# Patient Record
Sex: MALE | Race: WHITE | NOT HISPANIC OR LATINO | Employment: FULL TIME | URBAN - METROPOLITAN AREA
[De-identification: names, ages, dates, MRNs, and addresses within clinical notes are randomized per-mention and may not be internally consistent; named-entity substitution may affect disease eponyms.]

---

## 2017-01-22 DIAGNOSIS — J30.2 SEASONAL ALLERGIC RHINITIS, UNSPECIFIED ALLERGIC RHINITIS TRIGGER: ICD-10-CM

## 2017-01-22 DIAGNOSIS — J01.01 ACUTE RECURRENT MAXILLARY SINUSITIS: Primary | ICD-10-CM

## 2017-01-24 RX ORDER — CETIRIZINE HYDROCHLORIDE 10 MG/1
TABLET ORAL
Qty: 30 TABLET | Refills: 5 | Status: SHIPPED | OUTPATIENT
Start: 2017-01-24 | End: 2017-10-12

## 2017-01-24 NOTE — TELEPHONE ENCOUNTER
Zyrtec 10mg tab      Last Written Prescription Date: 12-  Last Fill Quantity: 30,  # refills: 0   Last Office Visit with G, P or [facility] prescribing provider: 7-

## 2017-04-17 ENCOUNTER — OFFICE VISIT (OUTPATIENT)
Dept: FAMILY MEDICINE | Facility: OTHER | Age: 32
End: 2017-04-17
Attending: NURSE PRACTITIONER
Payer: COMMERCIAL

## 2017-04-17 VITALS
DIASTOLIC BLOOD PRESSURE: 68 MMHG | TEMPERATURE: 98 F | HEART RATE: 64 BPM | WEIGHT: 231 LBS | BODY MASS INDEX: 32.34 KG/M2 | HEIGHT: 71 IN | SYSTOLIC BLOOD PRESSURE: 116 MMHG

## 2017-04-17 DIAGNOSIS — R06.2 WHEEZING: Primary | ICD-10-CM

## 2017-04-17 DIAGNOSIS — F41.9 ANXIETY: ICD-10-CM

## 2017-04-17 DIAGNOSIS — J31.0 CHRONIC RHINITIS: ICD-10-CM

## 2017-04-17 PROCEDURE — 99212 OFFICE O/P EST SF 10 MIN: CPT

## 2017-04-17 PROCEDURE — 99213 OFFICE O/P EST LOW 20 MIN: CPT | Performed by: NURSE PRACTITIONER

## 2017-04-17 RX ORDER — ALBUTEROL SULFATE 90 UG/1
2 AEROSOL, METERED RESPIRATORY (INHALATION) EVERY 4 HOURS PRN
Qty: 1 INHALER | Refills: 5 | Status: SHIPPED | OUTPATIENT
Start: 2017-04-17 | End: 2021-07-01

## 2017-04-17 RX ORDER — NAPROXEN 250 MG/1
TABLET ORAL
COMMUNITY
End: 2023-07-12

## 2017-04-17 RX ORDER — MONTELUKAST SODIUM 10 MG/1
10 TABLET ORAL AT BEDTIME
Qty: 90 TABLET | Refills: 1 | Status: SHIPPED | OUTPATIENT
Start: 2017-04-17 | End: 2017-07-21

## 2017-04-17 RX ORDER — ALPRAZOLAM 0.5 MG
0.25 TABLET ORAL
COMMUNITY
End: 2017-04-17 | Stop reason: ALTCHOICE

## 2017-04-17 RX ORDER — ELECTROLYTES/DEXTROSE
SOLUTION, ORAL ORAL
COMMUNITY

## 2017-04-17 RX ORDER — LORAZEPAM 0.5 MG/1
0.5 TABLET ORAL DAILY PRN
Qty: 20 TABLET | Refills: 0 | Status: SHIPPED | OUTPATIENT
Start: 2017-04-17 | End: 2018-01-04

## 2017-04-17 ASSESSMENT — PAIN SCALES - GENERAL: PAINLEVEL: NO PAIN (0)

## 2017-04-17 NOTE — PROGRESS NOTES
CHIEF COMPLAINT:  Chief Complaint   Patient presents with     URI     Patient has been having issues with sinuses, with sinus pressure, mucous was green, cough started last night.       SUBJECTIVE:   Dorian Cabrera  is here today because of:Sinus pressure and Cough  The patient has had symptoms of cough, earache, sore throat, sneezing, nasal congestion/runny nose, headache, facial pressure, wheezing.     Onset of symptoms was 5 days ago. Course of illness is better today than uesterday.  Patient admits to exposure to illness at home or work/school. Brother with bronchitis.  He does have seasonal allergies.   Patient denies vomiting and diarrhea  Treatment measures tried include zyrtec and flonase.  He has tried advil cold and sinus, Emergen-c and visine allergy relief eye drops which does help.    Patient is not a smoker      Anxiety:  Requesting refill of antianxiety medication.  Last fill was over 1 year ago.  Takes one tablet every couple of weeks.  Has used xanax in the past.        Past Medical History:   Diagnosis Date     Anxiety state, unspecified 08/10/2012     Chronic rhinitis 6/16/2016     Gastroesophageal reflux disease without esophagitis 6/16/2016     Panic disorder without agoraphobia 10/22/2012     Past Surgical History:   Procedure Laterality Date     CIRCUMCISION       HEAD & NECK SURGERY      oral surgery     HERNIA REPAIR, INGUINAL RT/LT      RT      MOUTH SURGERY       Current Outpatient Prescriptions   Medication Sig Dispense Refill     ALPRAZolam (XANAX) 0.5 MG tablet Take 0.25 mg by mouth       Multiple Vitamins-Minerals (MULTIVITAMIN ADULT) TABS        naproxen (NAPROSYN) 250 MG tablet        cetirizine (ZYRTEC) 10 MG tablet TAKE 1 TABLET BY MOUTH EVERY EVENING 30 tablet 5     sertraline (ZOLOFT) 25 MG tablet Take 0.5 tablets (12.5 mg) by mouth daily 30 tablet 1     traMADol (ULTRAM) 50 MG tablet Take 1-2 tablets ( mg) by mouth 2 times daily as needed for moderate pain 20 tablet 0      "fluticasone (FLONASE) 50 MCG/ACT nasal spray Spray 1-2 sprays into both nostrils daily 16 g 5     Omeprazole Magnesium (PRILOSEC OTC PO) Take 1 tablet by mouth daily as needed        Allergies   Allergen Reactions     Cats      Mold      Seasonal Allergies        Family and Social History are reviewed.    REVIEW OF SYSTEMS  Skin: negative  Eyes: itchy watery eyes  Ears/Nose/Throat: as above  Respiratory: Cough- productive mucus  Cardiovascular: negative  Gastrointestinal: negative  Genitourinary: negative  Musculoskeletal: negative  Neurologic: negative  Psychiatric: negative  Hematologic/Lymphatic/Immunologic: allergies  Endocrine: negative    OBJECTIVE:   Vital signs:/68 (BP Location: Left arm, Patient Position: Chair, Cuff Size: Adult Regular)  Pulse 64  Temp 98  F (36.7  C) (Tympanic)  Ht 5' 11\" (1.803 m)  Wt 231 lb (104.8 kg)  BMI 32.22 kg/m2   General: healthy, alert and no distress  Skin is unremarkable.  HEENT: bilateral TM fluid noted, neck without nodes and post nasal drip with cobblestoning noted.  Lungs chest clear to IPPA, no tachypnea, retractions or cyanosis and S1, S2 normal, no murmur, no gallop, rate regular  Rapid Strep Test is not performed  Psych:  Mentation normal, affect bright.     LABS AND IMAGING  Results for orders placed or performed in visit on 01/29/16   CT Sinus w/o Contrast    Narrative    SINUS CT    FINDINGS:  CT scan of the sinuses reveals mild mucosal thickening is  seen in the maxillary sinuses.  The frontal, ethmoid and sphenoid  sinuses are clear.    IMPRESSION:  MILD MUCOSAL THICKENING RIGHT MAXILLARY SINUS.  Exam Date: Feb 01, 2016 07:36:00 AM  Author: KENNEY URSHING  This report is final and signed         ASSESSMENT/PLAN:  1. Wheezing  - albuterol (PROAIR HFA/PROVENTIL HFA/VENTOLIN HFA) 108 (90 BASE) MCG/ACT Inhaler; Inhale 2 puffs into the lungs every 4 hours as needed  Dispense: 1 Inhaler; Refill: 5    2. Chronic rhinitis  Symptomatic  - Continue zyrtec and " flonase  - albuterol (PROAIR HFA/PROVENTIL HFA/VENTOLIN HFA) 108 (90 BASE) MCG/ACT Inhaler; Inhale 2 puffs into the lungs every 4 hours as needed  Dispense: 1 Inhaler; Refill: 5  - montelukast (SINGULAIR) 10 MG tablet; Take 1 tablet (10 mg) by mouth At Bedtime  Dispense: 90 tablet; Refill: 1    3. Anxiety  chronic  - LORazepam (ATIVAN) 0.5 MG tablet; Take 1 tablet (0.5 mg) by mouth daily as needed for anxiety Do not drive after taking this medication  Dispense: 20 tablet; Refill: 0           Increase fluids and rest.   Follow up if symptoms do not improve or worsen    Ana Mckinney,   Certified Adult Nurse Practitioner  [phone]

## 2017-04-17 NOTE — PATIENT INSTRUCTIONS
ASSESSMENT/PLAN:  1. Wheezing  - albuterol (PROAIR HFA/PROVENTIL HFA/VENTOLIN HFA) 108 (90 BASE) MCG/ACT Inhaler; Inhale 2 puffs into the lungs every 4 hours as needed  Dispense: 1 Inhaler; Refill: 5    2. Chronic rhinitis  Symptomatic  - Continue zyrtec and flonase  - albuterol (PROAIR HFA/PROVENTIL HFA/VENTOLIN HFA) 108 (90 BASE) MCG/ACT Inhaler; Inhale 2 puffs into the lungs every 4 hours as needed  Dispense: 1 Inhaler; Refill: 5  - montelukast (SINGULAIR) 10 MG tablet; Take 1 tablet (10 mg) by mouth At Bedtime  Dispense: 90 tablet; Refill: 1    3. Anxiety  chronic  - LORazepam (ATIVAN) 0.5 MG tablet; Take 1 tablet (0.5 mg) by mouth daily as needed for anxiety Do not drive after taking this medication  Dispense: 20 tablet; Refill: 0           Increase fluids and rest.   Follow up if symptoms do not improve or worsen    Ana Mckinney,   Certified Adult Nurse Practitioner  [phone]

## 2017-04-17 NOTE — MR AVS SNAPSHOT
After Visit Summary   4/17/2017    Dorian Cabrera    MRN: [id number]           Patient Information     Date Of Birth          1985        Visit Information        Provider Department      4/17/2017 1:15 PM Ana Mckinney NP [facility]        Today's Diagnoses     Wheezing    -  1    Chronic rhinitis        Anxiety          Care Instructions        ASSESSMENT/PLAN:  1. Wheezing  - albuterol (PROAIR HFA/PROVENTIL HFA/VENTOLIN HFA) 108 (90 BASE) MCG/ACT Inhaler; Inhale 2 puffs into the lungs every 4 hours as needed  Dispense: 1 Inhaler; Refill: 5    2. Chronic rhinitis  Symptomatic  - Continue zyrtec and flonase  - albuterol (PROAIR HFA/PROVENTIL HFA/VENTOLIN HFA) 108 (90 BASE) MCG/ACT Inhaler; Inhale 2 puffs into the lungs every 4 hours as needed  Dispense: 1 Inhaler; Refill: 5  - montelukast (SINGULAIR) 10 MG tablet; Take 1 tablet (10 mg) by mouth At Bedtime  Dispense: 90 tablet; Refill: 1    3. Anxiety  chronic  - LORazepam (ATIVAN) 0.5 MG tablet; Take 1 tablet (0.5 mg) by mouth daily as needed for anxiety Do not drive after taking this medication  Dispense: 20 tablet; Refill: 0           Increase fluids and rest.   Follow up if symptoms do not improve or worsen    Ana Mckinney,   Certified Adult Nurse Practitioner  [phone]        Follow-ups after your visit        Who to contact     If you have questions or need follow up information about today's clinic visit or your schedule please contact [facility] directly at [phone].  Normal or non-critical lab and imaging results will be communicated to you by MyChart, letter or phone within 4 business days after the clinic has received the results. If you do not hear from us within 7 days, please contact the clinic through MyChart or phone. If you have a critical or abnormal lab result, we will notify you by phone as soon as possible.  Submit refill requests through [employer] or call your pharmacy  "and they will forward the refill request to us. Please allow 3 business days for your refill to be completed.          Additional Information About Your Visit        [employer] Information     [employer] lets you send messages to your doctor, view your test results, renew your prescriptions, schedule appointments and more. To sign up, go to [email or web address] . Click on \"Log in\" on the left side of the screen, which will take you to the Welcome page. Then click on \"Sign up Now\" on the right side of the page.     You will be asked to enter the access code listed below, as well as some personal information. Please follow the directions to create your username and password.     Your access code is: [email or web address]  Expires: 2017  1:34 PM     Your access code will  in 90 days. If you need help or a new code, please call your [place] clinic or [phone].        Care EveryWhere ID     This is your Care EveryWhere ID. This could be used by other organizations to access your [place] medical records  [id number]        Your Vitals Were     Pulse Temperature Height BMI (Body Mass Index)          64 98  F (36.7  C) (Tympanic) 5' 11\" (1.803 m) 32.22 kg/m2         Blood Pressure from Last 3 Encounters:   17 116/68   16 132/66   16 118/64    Weight from Last 3 Encounters:   17 231 lb (104.8 kg)   16 210 lb (95.3 kg)   16 214 lb (97.1 kg)              Today, you had the following     No orders found for display         Today's Medication Changes          These changes are accurate as of: 17  1:34 PM.  If you have any questions, ask your nurse or doctor.               Start taking these medicines.        Dose/Directions    albuterol 108 (90 BASE) MCG/ACT Inhaler   Commonly known as:  PROAIR HFA/PROVENTIL HFA/VENTOLIN HFA   Used for:  Wheezing, Chronic rhinitis   Started by:  Ana Mckinney NP        Dose:  2 puff   Inhale 2 puffs into the lungs every 4 hours as needed "   Quantity:  1 Inhaler   Refills:  5       LORazepam 0.5 MG tablet   Commonly known as:  ATIVAN   Used for:  Anxiety   Started by:  Ana Mckinney NP        Dose:  0.5 mg   Take 1 tablet (0.5 mg) by mouth daily as needed for anxiety Do not drive after taking this medication   Quantity:  20 tablet   Refills:  0       montelukast 10 MG tablet   Commonly known as:  SINGULAIR   Used for:  Chronic rhinitis   Started by:  Ana Mckinney NP        Dose:  10 mg   Take 1 tablet (10 mg) by mouth At Bedtime   Quantity:  90 tablet   Refills:  1         Stop taking these medicines if you haven't already. Please contact your care team if you have questions.     ALPRAZolam 0.5 MG tablet   Commonly known as:  XANAX   Stopped by:  Ana Mckinney NP                Where to get your medicines      These medications were sent to [facility] [address] [place] [address] YOMI THOMPSON AT [facility] [address] [place] [address]     Phone:  [phone]     albuterol 108 (90 BASE) MCG/ACT Inhaler    montelukast 10 MG tablet         Some of these will need a paper prescription and others can be bought over the counter.  Ask your nurse if you have questions.     Bring a paper prescription for each of these medications     LORazepam 0.5 MG tablet                Primary Care Provider Office Phone # Fax #    Ana Mckinney [phone]       [facility] [address] [place]  [facility] [address]        Thank you!     Thank you for choosing [facility]  for your care. Our goal is always to provide you with excellent care. Hearing back from our patients is one way we can continue to improve our services. Please take a few minutes to complete the written survey that you may receive in the mail after your visit with us. Thank you!             Your Updated Medication List - Protect others around you: Learn how to safely use, store  and throw away your medicines at www.disposemymeds.org.          This list is accurate as of: 4/17/17  1:34 PM.  Always use your most recent med list.                   Brand Name Dispense Instructions for use    albuterol 108 (90 BASE) MCG/ACT Inhaler    PROAIR HFA/PROVENTIL HFA/VENTOLIN HFA    1 Inhaler    Inhale 2 puffs into the lungs every 4 hours as needed       cetirizine 10 MG tablet    zyrTEC    30 tablet    TAKE 1 TABLET BY MOUTH EVERY EVENING       fluticasone 50 MCG/ACT spray    FLONASE    16 g    Spray 1-2 sprays into both nostrils daily       LORazepam 0.5 MG tablet    ATIVAN    20 tablet    Take 1 tablet (0.5 mg) by mouth daily as needed for anxiety Do not drive after taking this medication       montelukast 10 MG tablet    SINGULAIR    90 tablet    Take 1 tablet (10 mg) by mouth At Bedtime       MULTIVITAMIN ADULT Tabs          naproxen 250 MG tablet    NAPROSYN         PRILOSEC OTC PO      Take 1 tablet by mouth daily as needed       sertraline 25 MG tablet    ZOLOFT    30 tablet    Take 0.5 tablets (12.5 mg) by mouth daily       traMADol 50 MG tablet    ULTRAM    20 tablet    Take 1-2 tablets ( mg) by mouth 2 times daily as needed for moderate pain

## 2017-04-17 NOTE — NURSING NOTE
"Chief Complaint   Patient presents with     URI     Patient has been having issues with sinuses, with sinus pressure, mucous was green, cough started last night.       Initial /68 (BP Location: Left arm, Patient Position: Chair, Cuff Size: Adult Regular)  Pulse 64  Temp 98  F (36.7  C) (Tympanic)  Ht 5' 11\" (1.803 m)  Wt 231 lb (104.8 kg)  BMI 32.22 kg/m2 Estimated body mass index is 32.22 kg/(m^2) as calculated from the following:    Height as of this encounter: 5' 11\" (1.803 m).    Weight as of this encounter: 231 lb (104.8 kg).  Medication Reconciliation: complete   Tobias Gutierrez LPN      "

## 2017-07-21 ENCOUNTER — OFFICE VISIT (OUTPATIENT)
Dept: FAMILY MEDICINE | Facility: OTHER | Age: 32
End: 2017-07-21
Attending: NURSE PRACTITIONER
Payer: COMMERCIAL

## 2017-07-21 VITALS
WEIGHT: 218 LBS | RESPIRATION RATE: 18 BRPM | DIASTOLIC BLOOD PRESSURE: 76 MMHG | BODY MASS INDEX: 30.52 KG/M2 | HEIGHT: 71 IN | HEART RATE: 84 BPM | TEMPERATURE: 97.6 F | OXYGEN SATURATION: 98 % | SYSTOLIC BLOOD PRESSURE: 122 MMHG

## 2017-07-21 DIAGNOSIS — K08.89 PAIN, DENTAL: Primary | ICD-10-CM

## 2017-07-21 PROCEDURE — 99213 OFFICE O/P EST LOW 20 MIN: CPT | Performed by: NURSE PRACTITIONER

## 2017-07-21 PROCEDURE — 99212 OFFICE O/P EST SF 10 MIN: CPT

## 2017-07-21 RX ORDER — IBUPROFEN 800 MG/1
800 TABLET, FILM COATED ORAL EVERY 8 HOURS PRN
Qty: 90 TABLET | Refills: 1 | Status: SHIPPED | OUTPATIENT
Start: 2017-07-21 | End: 2018-01-04

## 2017-07-21 ASSESSMENT — ANXIETY QUESTIONNAIRES
7. FEELING AFRAID AS IF SOMETHING AWFUL MIGHT HAPPEN: NOT AT ALL
3. WORRYING TOO MUCH ABOUT DIFFERENT THINGS: SEVERAL DAYS
5. BEING SO RESTLESS THAT IT IS HARD TO SIT STILL: SEVERAL DAYS
6. BECOMING EASILY ANNOYED OR IRRITABLE: SEVERAL DAYS
2. NOT BEING ABLE TO STOP OR CONTROL WORRYING: NOT AT ALL
1. FEELING NERVOUS, ANXIOUS, OR ON EDGE: SEVERAL DAYS
IF YOU CHECKED OFF ANY PROBLEMS ON THIS QUESTIONNAIRE, HOW DIFFICULT HAVE THESE PROBLEMS MADE IT FOR YOU TO DO YOUR WORK, TAKE CARE OF THINGS AT HOME, OR GET ALONG WITH OTHER PEOPLE: NOT DIFFICULT AT ALL
GAD7 TOTAL SCORE: 5

## 2017-07-21 ASSESSMENT — PAIN SCALES - GENERAL: PAINLEVEL: MODERATE PAIN (4)

## 2017-07-21 ASSESSMENT — PATIENT HEALTH QUESTIONNAIRE - PHQ9: 5. POOR APPETITE OR OVEREATING: SEVERAL DAYS

## 2017-07-21 NOTE — PATIENT INSTRUCTIONS
ASSESSMENT/PLAN:       1. Pain, dental  Start amoxicillin as prescribed by your dentist  Schedule follow-up for extraction   - ibuprofen (ADVIL/MOTRIN) 800 MG tablet; Take 1 tablet (800 mg) by mouth every 8 hours as needed for moderate pain  Dispense: 90 tablet; Refill: 1    FUTURE APPOINTMENTS:       - Follow-up visit as needed    Ana Mckinney, NP  [facility]

## 2017-07-21 NOTE — PROGRESS NOTES
SUBJECTIVE:                                                    Dorian Cabrera is a 31 year old male who presents to clinic today for the following health issues:  Chief Complaint   Patient presents with     Dental Pain     needs wisdom teeth pulled out onset 3 weeks.  has to call back on august 1st to dentist          Concern - dental pain  Onset: 3 weeks    Description:   Alexis tooth that has to be removed.  He has been referred to oral surgery and is waiting for an appt.  He saw his dentist yesterday and was prescribed amoxicillin of which he has not started.  Wanted to check here before starting.     Intensity: 3/10 - constant nagging pain    Progression of Symptoms:  worsening    Accompanying Signs & Symptoms:  No swelling    Previous history of similar problem:   Yes, following with dentist    Precipitating factors:   Worsened by: night    Alleviating factors:  Improved by:     Therapies Tried and outcome: ibuprofen    Denies fever, changes in appetite or ability to eat.       Problem list and histories reviewed & adjusted, as indicated.  Additional history: as documented  Patient Active Problem List   Diagnosis     Anxiety state     Panic disorder without agoraphobia     ACP (advance care planning)     Gastroesophageal reflux disease without esophagitis     Chronic rhinitis     Past Surgical History:   Procedure Laterality Date     CIRCUMCISION       HEAD & NECK SURGERY      oral surgery     HERNIA REPAIR, INGUINAL RT/LT      RT      MOUTH SURGERY         Social History   Substance Use Topics     Smoking status: Former Smoker     Packs/day: 0.50     Years: 5.00     Types: Cigarettes     Quit date: 12/1/2014     Smokeless tobacco: Never Used      Comment: quit dec 1 2014     Alcohol use Yes      Comment: 2 beers, rarely      Family History   Problem Relation Age of Onset     Hypertension Mother      Hypertension Father      Asthma Brother      CANCER Maternal Grandmother      pancreatic     HEART DISEASE  "Maternal Grandfather      CEREBROVASCULAR DISEASE Maternal Grandfather      Thyroid Disease Other      DIABETES No family hx of          Current Outpatient Prescriptions   Medication Sig Dispense Refill     Multiple Vitamins-Minerals (MULTIVITAMIN ADULT) TABS        naproxen (NAPROSYN) 250 MG tablet        albuterol (PROAIR HFA/PROVENTIL HFA/VENTOLIN HFA) 108 (90 BASE) MCG/ACT Inhaler Inhale 2 puffs into the lungs every 4 hours as needed 1 Inhaler 5     LORazepam (ATIVAN) 0.5 MG tablet Take 1 tablet (0.5 mg) by mouth daily as needed for anxiety Do not drive after taking this medication 20 tablet 0     cetirizine (ZYRTEC) 10 MG tablet TAKE 1 TABLET BY MOUTH EVERY EVENING 30 tablet 5     fluticasone (FLONASE) 50 MCG/ACT nasal spray Spray 1-2 sprays into both nostrils daily 16 g 5     Omeprazole Magnesium (PRILOSEC OTC PO) Take 1 tablet by mouth daily as needed       Allergies   Allergen Reactions     Cats      Mold      Seasonal Allergies      Recent Labs   Lab Test  10/12/15   1504  02/05/15   1412   TSH  2.92  3.15      BP Readings from Last 3 Encounters:   07/21/17 122/76   04/17/17 116/68   07/18/16 132/66    Wt Readings from Last 3 Encounters:   07/21/17 218 lb (98.9 kg)   04/17/17 231 lb (104.8 kg)   07/18/16 210 lb (95.3 kg)                          Reviewed and updated as needed this visit by clinical staffTobacco  Allergies  Meds  Problems       Reviewed and updated as needed this visit by Provider         ROS:  Constitutional, HEENT, cardiovascular, pulmonary, gi and gu systems are negative, except as otherwise noted.      OBJECTIVE:   /76 (BP Location: Left arm, Patient Position: Chair, Cuff Size: Adult Large)  Pulse 84  Temp 97.6  F (36.4  C) (Tympanic)  Resp 18  Ht 5' 11\" (1.803 m)  Wt 218 lb (98.9 kg)  SpO2 98%  BMI 30.4 kg/m2  Body mass index is 30.4 kg/(m^2).  GENERAL: healthy, alert and no distress  MS: no gross musculoskeletal defects noted, no edema  PSYCH: mentation appears normal, " affect normal/bright    ASSESSMENT/PLAN:       1. Pain, dental  Start amoxicillin as prescribed by your dentist  Schedule follow-up for extraction   - ibuprofen (ADVIL/MOTRIN) 800 MG tablet; Take 1 tablet (800 mg) by mouth every 8 hours as needed for moderate pain  Dispense: 90 tablet; Refill: 1    FUTURE APPOINTMENTS:       - Follow-up visit as needed    Ana Mckinney, NP  [facility]

## 2017-07-21 NOTE — MR AVS SNAPSHOT
"              After Visit Summary   7/21/2017    Dorina Cabrera    MRN: [id number]           Patient Information     Date Of Birth          1985        Visit Information        Provider Department      7/21/2017 2:30 PM Ana Mckinney NP [facility]        Today's Diagnoses     Pain, dental    -  1      Care Instructions      ASSESSMENT/PLAN:       1. Pain, dental  Start amoxicillin as prescribed by your dentist  Schedule follow-up for extraction   - ibuprofen (ADVIL/MOTRIN) 800 MG tablet; Take 1 tablet (800 mg) by mouth every 8 hours as needed for moderate pain  Dispense: 90 tablet; Refill: 1    FUTURE APPOINTMENTS:       - Follow-up visit as needed    Ana Mckinney NP  [facility]            Follow-ups after your visit        Who to contact     If you have questions or need follow up information about today's clinic visit or your schedule please contact [facility] directly at [phone].  Normal or non-critical lab and imaging results will be communicated to you by Shopflickhart, letter or phone within 4 business days after the clinic has received the results. If you do not hear from us within 7 days, please contact the clinic through Shopflickhart or phone. If you have a critical or abnormal lab result, we will notify you by phone as soon as possible.  Submit refill requests through [employer] or call your pharmacy and they will forward the refill request to us. Please allow 3 business days for your refill to be completed.          Additional Information About Your Visit        Shopflickhart Information     [employer] lets you send messages to your doctor, view your test results, renew your prescriptions, schedule appointments and more. To sign up, go to [email or web address] . Click on \"Log in\" on the left side of the screen, which will take you to the Welcome page. Then click on \"Sign up Now\" on the right side of the page.     You will be asked to enter the " "access code listed below, as well as some personal information. Please follow the directions to create your username and password.     Your access code is: [email or web address]  Expires: 10/19/2017  2:48 PM     Your access code will  in 90 days. If you need help or a new code, please call your [facility] or [phone].        Care EveryWhere ID     This is your Care EveryWhere ID. This could be used by other organizations to access your [place] medical records  [id number]        Your Vitals Were     Pulse Temperature Respirations Height Pulse Oximetry BMI (Body Mass Index)    84 97.6  F (36.4  C) (Tympanic) 18 5' 11\" (1.803 m) 98% 30.4 kg/m2       Blood Pressure from Last 3 Encounters:   17 122/76   17 116/68   16 132/66    Weight from Last 3 Encounters:   17 218 lb (98.9 kg)   17 231 lb (104.8 kg)   16 210 lb (95.3 kg)              Today, you had the following     No orders found for display         Today's Medication Changes          These changes are accurate as of: 17  2:48 PM.  If you have any questions, ask your nurse or doctor.               Start taking these medicines.        Dose/Directions    ibuprofen 800 MG tablet   Commonly known as:  ADVIL/MOTRIN   Used for:  Pain, dental   Started by:  Ana Mckinney NP        Dose:  800 mg   Take 1 tablet (800 mg) by mouth every 8 hours as needed for moderate pain   Quantity:  90 tablet   Refills:  1            Where to get your medicines      These medications were sent to [id number] - THIEN RAMIREZ [facility] AT [facility] [address]   [address] [address]     Phone:  [phone]     ibuprofen 800 MG tablet                Primary Care Provider Office Phone # Fax #    Ana Mckinney [phone]       [facility] [address] [place] [address]        Equal Access to Services     GROVER MCELROY AH: Roz huffman " red Greco, waaxda luqadaha, qaybta kaalmada efraín, luiz chapaida alejandro. So [facility] [phone].    ATENCIÓN: Si aid rios, tiene a poon disposición servicios gratuitos de asistencia lingüística. Madeline al [phone].    We comply with applicable federal civil rights laws and [place] laws. We do not discriminate on the basis of race, color, national origin, age, disability sex, sexual orientation or gender identity.            Thank you!     Thank you for choosing [facility]  for your care. Our goal is always to provide you with excellent care. Hearing back from our patients is one way we can continue to improve our services. Please take a few minutes to complete the written survey that you may receive in the mail after your visit with us. Thank you!             Your Updated Medication List - Protect others around you: Learn how to safely use, store and throw away your medicines at www.disposemymeds.org.          This list is accurate as of: 7/21/17  2:48 PM.  Always use your most recent med list.                   Brand Name Dispense Instructions for use Diagnosis    albuterol 108 (90 BASE) MCG/ACT Inhaler    PROAIR HFA/PROVENTIL HFA/VENTOLIN HFA    1 Inhaler    Inhale 2 puffs into the lungs every 4 hours as needed    Wheezing, Chronic rhinitis       cetirizine 10 MG tablet    zyrTEC    30 tablet    TAKE 1 TABLET BY MOUTH EVERY EVENING    Acute recurrent maxillary sinusitis, Seasonal allergic rhinitis, unspecified allergic rhinitis trigger       fluticasone 50 MCG/ACT spray    FLONASE    16 g    Spray 1-2 sprays into both nostrils daily    Acute recurrent maxillary sinusitis, Seasonal allergies       ibuprofen 800 MG tablet    ADVIL/MOTRIN    90 tablet    Take 1 tablet (800 mg) by mouth every 8 hours as needed for moderate pain    Pain, dental       LORazepam 0.5 MG tablet    ATIVAN    20 tablet    Take 1 tablet (0.5 mg) by mouth daily as needed for anxiety Do not  drive after taking this medication    Anxiety       MULTIVITAMIN ADULT Tabs           naproxen 250 MG tablet    NAPROSYN          PRILOSEC OTC PO      Take 1 tablet by mouth daily as needed

## 2017-07-22 ASSESSMENT — ANXIETY QUESTIONNAIRES: GAD7 TOTAL SCORE: 5

## 2017-07-22 ASSESSMENT — PATIENT HEALTH QUESTIONNAIRE - PHQ9: SUM OF ALL RESPONSES TO PHQ QUESTIONS 1-9: 1

## 2017-11-12 DIAGNOSIS — J01.01 ACUTE RECURRENT MAXILLARY SINUSITIS: ICD-10-CM

## 2017-11-12 DIAGNOSIS — J30.2 SEASONAL ALLERGIC RHINITIS, UNSPECIFIED CHRONICITY, UNSPECIFIED TRIGGER: ICD-10-CM

## 2017-11-13 RX ORDER — CETIRIZINE HYDROCHLORIDE 10 MG/1
TABLET ORAL
Qty: 30 TABLET | Refills: 0 | Status: SHIPPED | OUTPATIENT
Start: 2017-11-13 | End: 2018-01-04

## 2017-11-13 NOTE — TELEPHONE ENCOUNTER
Zyrtec  Last office visit: 1/29/16 (ENT)  Last refill: 10/13/17, Qty 30, 0 refills    Patient is overdue for a follow-up.    Thank you.

## 2018-01-04 ENCOUNTER — OFFICE VISIT (OUTPATIENT)
Dept: FAMILY MEDICINE | Facility: OTHER | Age: 33
End: 2018-01-04
Attending: NURSE PRACTITIONER
Payer: COMMERCIAL

## 2018-01-04 VITALS
TEMPERATURE: 97.7 F | SYSTOLIC BLOOD PRESSURE: 118 MMHG | HEART RATE: 94 BPM | WEIGHT: 236 LBS | OXYGEN SATURATION: 96 % | RESPIRATION RATE: 18 BRPM | DIASTOLIC BLOOD PRESSURE: 76 MMHG | HEIGHT: 71 IN | BODY MASS INDEX: 33.04 KG/M2

## 2018-01-04 DIAGNOSIS — F41.0 PANIC DISORDER WITHOUT AGORAPHOBIA: ICD-10-CM

## 2018-01-04 DIAGNOSIS — J30.2 SEASONAL ALLERGIC RHINITIS, UNSPECIFIED CHRONICITY, UNSPECIFIED TRIGGER: ICD-10-CM

## 2018-01-04 DIAGNOSIS — F41.9 ANXIETY: ICD-10-CM

## 2018-01-04 DIAGNOSIS — K21.9 GASTROESOPHAGEAL REFLUX DISEASE WITHOUT ESOPHAGITIS: Primary | ICD-10-CM

## 2018-01-04 PROCEDURE — 99214 OFFICE O/P EST MOD 30 MIN: CPT | Performed by: NURSE PRACTITIONER

## 2018-01-04 PROCEDURE — G0463 HOSPITAL OUTPT CLINIC VISIT: HCPCS

## 2018-01-04 RX ORDER — CETIRIZINE HYDROCHLORIDE 10 MG/1
TABLET ORAL
Qty: 90 TABLET | Refills: 3 | Status: SHIPPED | OUTPATIENT
Start: 2018-01-04

## 2018-01-04 RX ORDER — LORAZEPAM 0.5 MG/1
0.5 TABLET ORAL DAILY PRN
Qty: 20 TABLET | Refills: 0 | Status: SHIPPED | OUTPATIENT
Start: 2018-01-04 | End: 2018-12-14

## 2018-01-04 RX ORDER — FLUTICASONE PROPIONATE 50 MCG
1-2 SPRAY, SUSPENSION (ML) NASAL DAILY
Qty: 16 G | Refills: 5 | Status: SHIPPED | OUTPATIENT
Start: 2018-01-04 | End: 2021-07-01

## 2018-01-04 ASSESSMENT — ANXIETY QUESTIONNAIRES
6. BECOMING EASILY ANNOYED OR IRRITABLE: SEVERAL DAYS
7. FEELING AFRAID AS IF SOMETHING AWFUL MIGHT HAPPEN: NOT AT ALL
4. TROUBLE RELAXING: SEVERAL DAYS
1. FEELING NERVOUS, ANXIOUS, OR ON EDGE: MORE THAN HALF THE DAYS
5. BEING SO RESTLESS THAT IT IS HARD TO SIT STILL: SEVERAL DAYS
3. WORRYING TOO MUCH ABOUT DIFFERENT THINGS: SEVERAL DAYS
GAD7 TOTAL SCORE: 7
IF YOU CHECKED OFF ANY PROBLEMS ON THIS QUESTIONNAIRE, HOW DIFFICULT HAVE THESE PROBLEMS MADE IT FOR YOU TO DO YOUR WORK, TAKE CARE OF THINGS AT HOME, OR GET ALONG WITH OTHER PEOPLE: SOMEWHAT DIFFICULT
2. NOT BEING ABLE TO STOP OR CONTROL WORRYING: SEVERAL DAYS

## 2018-01-04 ASSESSMENT — PATIENT HEALTH QUESTIONNAIRE - PHQ9: SUM OF ALL RESPONSES TO PHQ QUESTIONS 1-9: 4

## 2018-01-04 ASSESSMENT — PAIN SCALES - GENERAL: PAINLEVEL: MODERATE PAIN (5)

## 2018-01-04 NOTE — PATIENT INSTRUCTIONS
ASSESSMENT/PLAN:       1. Gastroesophageal reflux disease without esophagitis  Continue current plan    2. Panic disorder without agoraphobia  chronic  - LORazepam (ATIVAN) 0.5 MG tablet; Take 1 tablet (0.5 mg) by mouth daily as needed for anxiety Do not drive after taking this medication  Dispense: 20 tablet; Refill: 0  - fluticasone (FLONASE) 50 MCG/ACT spray; Spray 1-2 sprays into both nostrils daily  Dispense: 16 g; Refill: 5  - cetirizine (ZYRTEC) 10 MG tablet; TAKE 1 TABLET BY MOUTH EVERY EVENING  Dispense: 90 tablet; Refill: 3    3. Seasonal allergic rhinitis, unspecified chronicity, unspecified trigger  chronic  - fluticasone (FLONASE) 50 MCG/ACT spray; Spray 1-2 sprays into both nostrils daily  Dispense: 16 g; Refill: 5  - cetirizine (ZYRTEC) 10 MG tablet; TAKE 1 TABLET BY MOUTH EVERY EVENING  Dispense: 90 tablet; Refill: 3    4. Anxiety  chronic  - LORazepam (ATIVAN) 0.5 MG tablet; Take 1 tablet (0.5 mg) by mouth daily as needed for anxiety Do not drive after taking this medication  Dispense: 20 tablet; Refill: 0    FUTURE APPOINTMENTS:       - Follow-up visit in 6 months or as needed for acute concerns.     Ana Mckinney, NP  [facility]

## 2018-01-04 NOTE — NURSING NOTE
"Chief Complaint   Patient presents with     Recheck Medication       Initial /76 (BP Location: Right arm, Patient Position: Chair, Cuff Size: Adult Large)  Pulse 94  Temp 97.7  F (36.5  C) (Tympanic)  Resp 18  Ht 5' 11\" (1.803 m)  Wt 236 lb (107 kg)  SpO2 96%  BMI 32.92 kg/m2 Estimated body mass index is 32.92 kg/(m^2) as calculated from the following:    Height as of this encounter: 5' 11\" (1.803 m).    Weight as of this encounter: 236 lb (107 kg).  Medication Reconciliation: complete   Pamela M Lechevalier LPN      "

## 2018-01-04 NOTE — PROGRESS NOTES
SUBJECTIVE:   Dorian Cabrera is a 32 year old male who presents to clinic today for the following health issues:  Medication recheck     Medication Followup of all medications    Taking Medication as prescribed: yes    Side Effects:  None    Medication Helping Symptoms:  yes       GERD: takes priolsec as needed    Allergies - usually well controlled is has meds, ran out and needs refills.     Anxiety Follow-Up    Status since last visit: stable    Other associated symptoms:None    Complicating factors:   Significant life event: completed school, holidays, dental issues  Uses ativan once every 3 weeks on average.     Current substance abuse: denies  Depression symptoms: No  АЛЕКСАНДР-7 SCORE 6/16/2016 7/21/2017 1/4/2018   Total Score 3 5 7       GAD7        Problem list and histories reviewed & adjusted, as indicated.  Additional history: as documented    Patient Active Problem List   Diagnosis     Anxiety state     Panic disorder without agoraphobia     ACP (advance care planning)     Gastroesophageal reflux disease without esophagitis     Chronic rhinitis     Past Surgical History:   Procedure Laterality Date     CIRCUMCISION       HEAD & NECK SURGERY      oral surgery     HERNIA REPAIR, INGUINAL RT/LT      RT      MOUTH SURGERY         Social History   Substance Use Topics     Smoking status: Former Smoker     Packs/day: 0.50     Years: 5.00     Types: Cigarettes     Quit date: 12/1/2014     Smokeless tobacco: Never Used      Comment: quit dec 1 2014     Alcohol use Yes      Comment: 2 beers, rarely      Family History   Problem Relation Age of Onset     Hypertension Mother      Hypertension Father      Asthma Brother      CANCER Maternal Grandmother      pancreatic     HEART DISEASE Maternal Grandfather      CEREBROVASCULAR DISEASE Maternal Grandfather      Thyroid Disease Other      DIABETES No family hx of          Current Outpatient Prescriptions   Medication Sig Dispense Refill     cetirizine (ZYRTEC) 10 MG  "tablet TAKE 1 TABLET BY MOUTH EVERY EVENING 30 tablet 0     Multiple Vitamins-Minerals (MULTIVITAMIN ADULT) TABS        naproxen (NAPROSYN) 250 MG tablet        albuterol (PROAIR HFA/PROVENTIL HFA/VENTOLIN HFA) 108 (90 BASE) MCG/ACT Inhaler Inhale 2 puffs into the lungs every 4 hours as needed 1 Inhaler 5     LORazepam (ATIVAN) 0.5 MG tablet Take 1 tablet (0.5 mg) by mouth daily as needed for anxiety Do not drive after taking this medication 20 tablet 0     fluticasone (FLONASE) 50 MCG/ACT nasal spray Spray 1-2 sprays into both nostrils daily 16 g 5     Omeprazole Magnesium (PRILOSEC OTC PO) Take 1 tablet by mouth daily as needed       Allergies   Allergen Reactions     Cats      Mold      Seasonal Allergies      Recent Labs   Lab Test  10/12/15   1504  02/05/15   1412   TSH  2.92  3.15      BP Readings from Last 3 Encounters:   01/04/18 118/76   07/21/17 122/76   04/17/17 116/68    Wt Readings from Last 3 Encounters:   01/04/18 236 lb (107 kg)   07/21/17 218 lb (98.9 kg)   04/17/17 231 lb (104.8 kg)            Reviewed and updated as needed this visit by clinical staffTobacco  Allergies       Reviewed and updated as needed this visit by Provider         ROS:  Constitutional, HEENT, cardiovascular, pulmonary, gi and gu systems are negative, except as otherwise noted.      OBJECTIVE:   /76 (BP Location: Right arm, Patient Position: Chair, Cuff Size: Adult Large)  Pulse 94  Temp 97.7  F (36.5  C) (Tympanic)  Resp 18  Ht 5' 11\" (1.803 m)  Wt 236 lb (107 kg)  SpO2 96%  BMI 32.92 kg/m2  Body mass index is 32.92 kg/(m^2).  GENERAL: healthy, alert and no distress  NECK: no adenopathy, no asymmetry, masses, or scars and thyroid normal to palpation  RESP: lungs clear to auscultation - no rales, rhonchi or wheezes  CV: regular rate and rhythm, normal S1 S2, no S3 or S4, no murmur, click or rub, no peripheral edema and peripheral pulses strong  MS: no gross musculoskeletal defects noted, no edema  PSYCH: " mentation appears normal, affect normal/bright        ASSESSMENT/PLAN:       1. Gastroesophageal reflux disease without esophagitis  Continue current plan    2. Panic disorder without agoraphobia  chronic  - LORazepam (ATIVAN) 0.5 MG tablet; Take 1 tablet (0.5 mg) by mouth daily as needed for anxiety Do not drive after taking this medication  Dispense: 20 tablet; Refill: 0  - fluticasone (FLONASE) 50 MCG/ACT spray; Spray 1-2 sprays into both nostrils daily  Dispense: 16 g; Refill: 5  - cetirizine (ZYRTEC) 10 MG tablet; TAKE 1 TABLET BY MOUTH EVERY EVENING  Dispense: 90 tablet; Refill: 3    3. Seasonal allergic rhinitis, unspecified chronicity, unspecified trigger  chronic  - fluticasone (FLONASE) 50 MCG/ACT spray; Spray 1-2 sprays into both nostrils daily  Dispense: 16 g; Refill: 5  - cetirizine (ZYRTEC) 10 MG tablet; TAKE 1 TABLET BY MOUTH EVERY EVENING  Dispense: 90 tablet; Refill: 3    4. Anxiety  chronic  - LORazepam (ATIVAN) 0.5 MG tablet; Take 1 tablet (0.5 mg) by mouth daily as needed for anxiety Do not drive after taking this medication  Dispense: 20 tablet; Refill: 0    FUTURE APPOINTMENTS:       - Follow-up visit in 6 months or as needed for acute concerns.     Ana Mckinney, NP  [facility]

## 2018-01-04 NOTE — MR AVS SNAPSHOT
After Visit Summary   1/4/2018    Dorian Cabrera    MRN: [id number]           Patient Information     Date Of Birth          1985        Visit Information        Provider Department      1/4/2018 10:15 AM Ana Mckinney NP [facility]        Today's Diagnoses     Gastroesophageal reflux disease without esophagitis    -  1    Panic disorder without agoraphobia        Seasonal allergic rhinitis, unspecified chronicity, unspecified trigger        Anxiety          Care Instructions      ASSESSMENT/PLAN:       1. Gastroesophageal reflux disease without esophagitis  Continue current plan    2. Panic disorder without agoraphobia  chronic  - LORazepam (ATIVAN) 0.5 MG tablet; Take 1 tablet (0.5 mg) by mouth daily as needed for anxiety Do not drive after taking this medication  Dispense: 20 tablet; Refill: 0  - fluticasone (FLONASE) 50 MCG/ACT spray; Spray 1-2 sprays into both nostrils daily  Dispense: 16 g; Refill: 5  - cetirizine (ZYRTEC) 10 MG tablet; TAKE 1 TABLET BY MOUTH EVERY EVENING  Dispense: 90 tablet; Refill: 3    3. Seasonal allergic rhinitis, unspecified chronicity, unspecified trigger  chronic  - fluticasone (FLONASE) 50 MCG/ACT spray; Spray 1-2 sprays into both nostrils daily  Dispense: 16 g; Refill: 5  - cetirizine (ZYRTEC) 10 MG tablet; TAKE 1 TABLET BY MOUTH EVERY EVENING  Dispense: 90 tablet; Refill: 3    4. Anxiety  chronic  - LORazepam (ATIVAN) 0.5 MG tablet; Take 1 tablet (0.5 mg) by mouth daily as needed for anxiety Do not drive after taking this medication  Dispense: 20 tablet; Refill: 0    FUTURE APPOINTMENTS:       - Follow-up visit in 6 months or as needed for acute concerns.     Ana Mckinney NP  [facility]            Follow-ups after your visit        Follow-up notes from your care team     Return in about 6 months (around 7/4/2018), or if symptoms worsen or fail to improve.      Your next 10 appointments already scheduled      "2018 10:00 AM CDT   (Arrive by 9:45 AM)   SHORT with Ana Mckinney NP   [facility] ([facility] [place] )    [address]  [place] [address]   [phone]              Who to contact     If you have questions or need follow up information about today's clinic visit or your schedule please contact [facility] directly at [phone].  Normal or non-critical lab and imaging results will be communicated to you by MyChart, letter or phone within 4 business days after the clinic has received the results. If you do not hear from us within 7 days, please contact the clinic through MobileTaghart or phone. If you have a critical or abnormal lab result, we will notify you by phone as soon as possible.  Submit refill requests through TweetMeme or call your pharmacy and they will forward the refill request to us. Please allow 3 business days for your refill to be completed.          Additional Information About Your Visit        [facility]Tag[facility] Information     TweetMeme lets you send messages to your doctor, view your test results, renew your prescriptions, schedule appointments and more. To sign up, go to [email or web address] . Click on \"Log in\" on the left side of the screen, which will take you to the Welcome page. Then click on \"Sign up Now\" on the right side of the page.     You will be asked to enter the access code listed below, as well as some personal information. Please follow the directions to create your username and password.     Your access code is: [email or web address]  Expires: 2018  6:38 PM     Your access code will  in 90 days. If you need help or a new code, please call your [place] clinic or [phone].        Care EveryWhere ID     This is your Care EveryWhere ID. This could be used by other organizations to access your [place] medical records  [id number]        Your Vitals Were     Pulse Temperature Respirations Height Pulse Oximetry " "BMI (Body Mass Index)    94 97.7  F (36.5  C) (Tympanic) 18 5' 11\" (1.803 m) 96% 32.92 kg/m2       Blood Pressure from Last 3 Encounters:   01/04/18 118/76   07/21/17 122/76   04/17/17 116/68    Weight from Last 3 Encounters:   01/04/18 236 lb (107 kg)   07/21/17 218 lb (98.9 kg)   04/17/17 231 lb (104.8 kg)              Today, you had the following     No orders found for display         Today's Medication Changes          These changes are accurate as of: 1/4/18  6:38 PM.  If you have any questions, ask your nurse or doctor.               These medicines have changed or have updated prescriptions.        Dose/Directions    cetirizine 10 MG tablet   Commonly known as:  zyrTEC   This may have changed:  See the new instructions.   Used for:  Seasonal allergic rhinitis, unspecified chronicity, unspecified trigger   Changed by:  Ana Mckinney NP        TAKE 1 TABLET BY MOUTH EVERY EVENING   Quantity:  90 tablet   Refills:  3            Where to get your medicines      These medications were sent to [facility] [address] [place] [address]  AT [facility] [address]  [address] [facility] [address]     Phone:  [phone]     cetirizine 10 MG tablet    fluticasone 50 MCG/ACT spray         Some of these will need a paper prescription and others can be bought over the counter.  Ask your nurse if you have questions.     Bring a paper prescription for each of these medications     LORazepam 0.5 MG tablet                Primary Care Provider Office Phone # Fax #    Ana Mckinney -[phone] [address] [facility] [address]        Equal Access to Services     GROVER MCELROY AH: Roz Greco, daniel luna, bola kaalmada efraín, luiz allen. So [facility] [phone].    ATENCIÓN: Si habla español, tiene a poon disposición servicios gratuitos de asistencia lingüística. Llame al " [phone].    We comply with applicable federal civil rights laws and [place] laws. We do not discriminate on the basis of race, color, national origin, age, disability, sex, sexual orientation, or gender identity.            Thank you!     Thank you for choosing [facility]  for your care. Our goal is always to provide you with excellent care. Hearing back from our patients is one way we can continue to improve our services. Please take a few minutes to complete the written survey that you may receive in the mail after your visit with us. Thank you!             Your Updated Medication List - Protect others around you: Learn how to safely use, store and throw away your medicines at www.disposemymeds.org.          This list is accurate as of: 1/4/18  6:38 PM.  Always use your most recent med list.                   Brand Name Dispense Instructions for use Diagnosis    albuterol 108 (90 BASE) MCG/ACT Inhaler    PROAIR HFA/PROVENTIL HFA/VENTOLIN HFA    1 Inhaler    Inhale 2 puffs into the lungs every 4 hours as needed    Wheezing, Chronic rhinitis       cetirizine 10 MG tablet    zyrTEC    90 tablet    TAKE 1 TABLET BY MOUTH EVERY EVENING    Seasonal allergic rhinitis, unspecified chronicity, unspecified trigger       fluticasone 50 MCG/ACT spray    FLONASE    16 g    Spray 1-2 sprays into both nostrils daily    Seasonal allergic rhinitis, unspecified chronicity, unspecified trigger       LORazepam 0.5 MG tablet    ATIVAN    20 tablet    Take 1 tablet (0.5 mg) by mouth daily as needed for anxiety Do not drive after taking this medication    Anxiety, Panic disorder without agoraphobia       MULTIVITAMIN ADULT Tabs           naproxen 250 MG tablet    NAPROSYN          PRILOSEC OTC PO      Take 1 tablet by mouth daily as needed

## 2018-01-05 ASSESSMENT — ANXIETY QUESTIONNAIRES: GAD7 TOTAL SCORE: 7

## 2018-12-13 NOTE — PATIENT INSTRUCTIONS
ASSESSMENT/PLAN:   1. Anxiety  - LORazepam (ATIVAN) 0.5 MG tablet; Take 1 tablet (0.5 mg) by mouth daily as needed for anxiety Do not drive after taking this medication  Dispense: 20 tablet; Refill: 0    2. Panic disorder without agoraphobia  - LORazepam (ATIVAN) 0.5 MG tablet; Take 1 tablet (0.5 mg) by mouth daily as needed for anxiety Do not drive after taking this medication  Dispense: 20 tablet; Refill: 0    3. Erectile dysfunction, unspecified erectile dysfunction type  - PSA, screen; Future  - Testosterone Free and Total; Future  - sildenafil (VIAGRA) 100 MG tablet; Take 1/2-1 tablet 30 minutes prior to intercourse  Dispense: 12 tablet; Refill: 5    4. Potential exposure to STD  - NEISSERIA GONORRHOEA PCR; Future  - CHLAMYDIA TRACHOMATIS PCR; Future  - HIV Antigen Antibody Combo; Future  - Hepatitis C Screen Reflex to HCV RNA Quant and Genotype; Future  - Treponema Abs w Reflex to RPR and Titer; Future    COUNSELING:  Reviewed preventive health counseling, as reflected in patient instructions       Regular exercise       Healthy diet/nutrition       Vision screening       Hearing screening       Immunizations    Declined flu vaccine today             Safe sex practices/STD prevention    Preventive Health Recommendations  Male Ages 26 - 39    Yearly exam:             See your health care provider every year in order to  o   Review health changes.   o   Discuss preventive care.    o   Review your medicines if your doctor has prescribed any.    You should be tested each year for STDs (sexually transmitted diseases), if you re at risk.     After age 35, talk to your provider about cholesterol testing. If you are at risk for heart disease, have your cholesterol tested at least every 5 years.     If you are at risk for diabetes, you should have a diabetes test (fasting glucose).  Shots: Get a flu shot each year. Get a tetanus shot every 10 years.     Nutrition:    Eat at least 5 servings of fruits and vegetables  daily.     Eat whole-grain bread, whole-wheat pasta and brown rice instead of white grains and rice.     Get adequate Calcium and Vitamin D.     Lifestyle    Exercise for at least 150 minutes a week (30 minutes a day, 5 days a week). This will help you control your weight and prevent disease.     Limit alcohol to one drink per day.     No smoking.     Wear sunscreen to prevent skin cancer.     See your dentist every six months for an exam and cleaning.

## 2018-12-13 NOTE — PROGRESS NOTES
SUBJECTIVE:   CC: Dorian Cabrera is an 33 year old male who presents for preventive health visit.     Healthy Habits:    Do you get at least three servings of calcium containing foods daily (dairy, green leafy vegetables, etc.)? yes    Amount of exercise or daily activities, outside of work: 3 day(s) per week    Problems taking medications regularly No    Medication side effects: No    Have you had an eye exam in the past two years? yes    Do you see a dentist twice per year? Once a year    Do you have sleep apnea, excessive snoring or daytime drowsiness?no      GERD/Heartburn  Patient reports well controlled and does not take the omeprazole as often    Anxiety Follow-Up    Status since last visit: Much improved    Other associated symptoms:None    Complicating factors:   Significant life event: Yes-  New job - .    Current substance abuse: None  Depression symptoms: No  АЛЕКСАНДР-7 SCORE 2018   Total Score 5 7 2       АЛЕКСАНДР-7      Abuse: Current or Past(Physical, Sexual or Emotional)- No  Do you feel safe in your environment? Yes    Social History     Tobacco Use     Smoking status: Former Smoker     Packs/day: 0.50     Years: 5.00     Pack years: 2.50     Types: Cigarettes     Last attempt to quit: 2014     Years since quittin.0     Smokeless tobacco: Never Used     Tobacco comment: quit dec 1 2014   Substance Use Topics     Alcohol use: Yes     Comment: 2 beers, rarely      If you drink alcohol do you typically have >3 drinks per day or >7 drinks per week?   socially                      Last PSA: No results found for: PSA     Notes erectile dysfunction - difficulty getting and maintaining an erection.  States drive and libido are intact.  Is in a new relationship, is not sure if it is due to physical issue or anxiety.     Reviewed orders with patient. Reviewed health maintenance and updated orders accordingly - Yes  BP Readings from Last 3 Encounters:   18 102/72    18 118/76   17 122/76    Wt Readings from Last 3 Encounters:   18 87.3 kg (192 lb 6.4 oz)   18 107 kg (236 lb)   17 98.9 kg (218 lb)                  Patient Active Problem List   Diagnosis     Anxiety state     Panic disorder without agoraphobia     ACP (advance care planning)     Gastroesophageal reflux disease without esophagitis     Chronic rhinitis     Past Surgical History:   Procedure Laterality Date     CIRCUMCISION       HEAD & NECK SURGERY      oral surgery     HERNIA REPAIR, INGUINAL RT/LT      RT      MOUTH SURGERY         Social History     Tobacco Use     Smoking status: Former Smoker     Packs/day: 0.50     Years: 5.00     Pack years: 2.50     Types: Cigarettes     Last attempt to quit: 2014     Years since quittin.0     Smokeless tobacco: Never Used     Tobacco comment: quit dec 1 2014   Substance Use Topics     Alcohol use: Yes     Comment: 2 beers, rarely      Family History   Problem Relation Age of Onset     Hypertension Mother      Hypertension Father      Asthma Brother      Cancer Maternal Grandmother         pancreatic     Heart Disease Maternal Grandfather      Cerebrovascular Disease Maternal Grandfather      Thyroid Disease Other      Diabetes No family hx of          Current Outpatient Medications   Medication Sig Dispense Refill     albuterol (PROAIR HFA/PROVENTIL HFA/VENTOLIN HFA) 108 (90 BASE) MCG/ACT Inhaler Inhale 2 puffs into the lungs every 4 hours as needed 1 Inhaler 5     cetirizine (ZYRTEC) 10 MG tablet TAKE 1 TABLET BY MOUTH EVERY EVENING 90 tablet 3     fluticasone (FLONASE) 50 MCG/ACT spray Spray 1-2 sprays into both nostrils daily 16 g 5     LORazepam (ATIVAN) 0.5 MG tablet Take 1 tablet (0.5 mg) by mouth daily as needed for anxiety Do not drive after taking this medication 20 tablet 0     Multiple Vitamins-Minerals (MULTIVITAMIN ADULT) TABS        naproxen (NAPROSYN) 250 MG tablet        Omeprazole Magnesium (PRILOSEC OTC PO) Take 1  "tablet by mouth daily as needed       Allergies   Allergen Reactions     Cats      Mold      Seasonal Allergies      Recent Labs   Lab Test 10/12/15  1504 02/05/15  1412   TSH 2.92 3.15        Reviewed and updated as needed this visit by clinical staff  Tobacco  Allergies         Reviewed and updated as needed this visit by Provider          ROS:  CONSTITUTIONAL: NEGATIVE for fever, chills, change in weight  INTEGUMENTARY/SKIN: NEGATIVE for worrisome rashes, moles or lesions  EYES: NEGATIVE for vision changes or irritation  ENT: NEGATIVE for ear, mouth and throat problems  RESP: NEGATIVE for significant cough or SOB  CV: NEGATIVE for chest pain, palpitations or peripheral edema  GI: NEGATIVE for nausea, abdominal pain, heartburn, or change in bowel habits   male: negative for dysuria, hematuria, decreased urinary stream, erectile dysfunction, urethral discharge  MUSCULOSKELETAL: NEGATIVE for significant arthralgias or myalgia  NEURO: NEGATIVE for weakness, dizziness or paresthesias  PSYCHIATRIC: NEGATIVE for changes in mood or affect    OBJECTIVE:   /72 (BP Location: Right arm, Patient Position: Sitting, Cuff Size: Adult Regular)   Pulse 80   Temp 97.9  F (36.6  C) (Tympanic)   Resp 16   Ht 1.778 m (5' 10\")   Wt 87.3 kg (192 lb 6.4 oz)   SpO2 96%   BMI 27.61 kg/m    EXAM:  GENERAL: healthy, alert and no distress  NECK: no adenopathy, no asymmetry, masses, or scars and thyroid normal to palpation  RESP: lungs clear to auscultation - no rales, rhonchi or wheezes  CV: regular rate and rhythm, normal S1 S2, no S3 or S4, no murmur, click or rub, no peripheral edema and peripheral pulses strong  ABDOMEN: soft, nontender, no hepatosplenomegaly, no masses and bowel sounds normal   (male): normal male genitalia without lesions or urethral discharge, no hernia  MS: no gross musculoskeletal defects noted, no edema  SKIN: no suspicious lesions or rashes  NEURO: Normal strength and tone, mentation intact and " "speech normal  PSYCH: mentation appears normal, affect normal/bright        ASSESSMENT/PLAN:   1. Anxiety  - LORazepam (ATIVAN) 0.5 MG tablet; Take 1 tablet (0.5 mg) by mouth daily as needed for anxiety Do not drive after taking this medication  Dispense: 20 tablet; Refill: 0    2. Panic disorder without agoraphobia  - LORazepam (ATIVAN) 0.5 MG tablet; Take 1 tablet (0.5 mg) by mouth daily as needed for anxiety Do not drive after taking this medication  Dispense: 20 tablet; Refill: 0    3. Erectile dysfunction, unspecified erectile dysfunction type  - PSA, screen; Future  - Testosterone Free and Total; Future  - sildenafil (VIAGRA) 100 MG tablet; Take 1/2-1 tablet 30 minutes prior to intercourse  Dispense: 12 tablet; Refill: 5    4. Potential exposure to STD  - NEISSERIA GONORRHOEA PCR; Future  - CHLAMYDIA TRACHOMATIS PCR; Future  - HIV Antigen Antibody Combo; Future  - Hepatitis C Screen Reflex to HCV RNA Quant and Genotype; Future  - Treponema Abs w Reflex to RPR and Titer; Future    COUNSELING:  Reviewed preventive health counseling, as reflected in patient instructions       Regular exercise       Healthy diet/nutrition       Vision screening       Hearing screening       Immunizations    Declined flu vaccine today             Safe sex practices/STD prevention    BP Readings from Last 1 Encounters:   12/14/18 102/72     Estimated body mass index is 27.61 kg/m  as calculated from the following:    Height as of this encounter: 1.778 m (5' 10\").    Weight as of this encounter: 87.3 kg (192 lb 6.4 oz).           reports that he quit smoking about 4 years ago. His smoking use included cigarettes. He has a 2.50 pack-year smoking history. he has never used smokeless tobacco.      Counseling Resources:  ATP IV Guidelines  Pooled Cohorts Equation Calculator  FRAX Risk Assessment  ICSI Preventive Guidelines  Dietary Guidelines for Americans, 2010  USDA's MyPlate  ASA Prophylaxis  Lung CA Screening    Ana GUNTER " ZHANE Mckinney  [facility]

## 2018-12-14 ENCOUNTER — OFFICE VISIT (OUTPATIENT)
Dept: FAMILY MEDICINE | Facility: OTHER | Age: 33
End: 2018-12-14
Attending: NURSE PRACTITIONER
Payer: COMMERCIAL

## 2018-12-14 VITALS
HEIGHT: 70 IN | RESPIRATION RATE: 16 BRPM | HEART RATE: 80 BPM | TEMPERATURE: 97.9 F | SYSTOLIC BLOOD PRESSURE: 102 MMHG | BODY MASS INDEX: 27.54 KG/M2 | WEIGHT: 192.4 LBS | OXYGEN SATURATION: 96 % | DIASTOLIC BLOOD PRESSURE: 72 MMHG

## 2018-12-14 DIAGNOSIS — Z20.2 POTENTIAL EXPOSURE TO STD: ICD-10-CM

## 2018-12-14 DIAGNOSIS — F41.0 PANIC DISORDER WITHOUT AGORAPHOBIA: ICD-10-CM

## 2018-12-14 DIAGNOSIS — N52.9 ERECTILE DYSFUNCTION, UNSPECIFIED ERECTILE DYSFUNCTION TYPE: Primary | ICD-10-CM

## 2018-12-14 DIAGNOSIS — F41.9 ANXIETY: ICD-10-CM

## 2018-12-14 PROCEDURE — 99395 PREV VISIT EST AGE 18-39: CPT | Performed by: NURSE PRACTITIONER

## 2018-12-14 RX ORDER — LORAZEPAM 0.5 MG/1
0.5 TABLET ORAL DAILY PRN
Qty: 20 TABLET | Refills: 0 | Status: SHIPPED | OUTPATIENT
Start: 2018-12-14 | End: 2022-08-19

## 2018-12-14 RX ORDER — SILDENAFIL 100 MG/1
TABLET, FILM COATED ORAL
Qty: 12 TABLET | Refills: 5 | Status: SHIPPED | OUTPATIENT
Start: 2018-12-14 | End: 2018-12-14

## 2018-12-14 RX ORDER — SILDENAFIL 100 MG/1
TABLET, FILM COATED ORAL
Qty: 12 TABLET | Refills: 5 | Status: SHIPPED | OUTPATIENT
Start: 2018-12-14 | End: 2021-07-01

## 2018-12-14 ASSESSMENT — MIFFLIN-ST. JEOR: SCORE: 1833.97

## 2018-12-14 ASSESSMENT — ANXIETY QUESTIONNAIRES
1. FEELING NERVOUS, ANXIOUS, OR ON EDGE: SEVERAL DAYS
7. FEELING AFRAID AS IF SOMETHING AWFUL MIGHT HAPPEN: NOT AT ALL
3. WORRYING TOO MUCH ABOUT DIFFERENT THINGS: SEVERAL DAYS
5. BEING SO RESTLESS THAT IT IS HARD TO SIT STILL: NOT AT ALL
2. NOT BEING ABLE TO STOP OR CONTROL WORRYING: NOT AT ALL
IF YOU CHECKED OFF ANY PROBLEMS ON THIS QUESTIONNAIRE, HOW DIFFICULT HAVE THESE PROBLEMS MADE IT FOR YOU TO DO YOUR WORK, TAKE CARE OF THINGS AT HOME, OR GET ALONG WITH OTHER PEOPLE: NOT DIFFICULT AT ALL
GAD7 TOTAL SCORE: 2
6. BECOMING EASILY ANNOYED OR IRRITABLE: NOT AT ALL

## 2018-12-14 ASSESSMENT — PATIENT HEALTH QUESTIONNAIRE - PHQ9
SUM OF ALL RESPONSES TO PHQ QUESTIONS 1-9: 2
5. POOR APPETITE OR OVEREATING: NOT AT ALL

## 2018-12-14 ASSESSMENT — PAIN SCALES - GENERAL: PAINLEVEL: NO PAIN (0)

## 2018-12-14 NOTE — NURSING NOTE
"Chief Complaint   Patient presents with     Physical     Gastrophageal Reflux     Anxiety       Initial /72 (BP Location: Right arm, Patient Position: Sitting, Cuff Size: Adult Regular)   Pulse 80   Temp 97.9  F (36.6  C) (Tympanic)   Resp 16   Ht 1.778 m (5' 10\")   Wt 87.3 kg (192 lb 6.4 oz)   SpO2 96%   BMI 27.61 kg/m   Estimated body mass index is 27.61 kg/m  as calculated from the following:    Height as of this encounter: 1.778 m (5' 10\").    Weight as of this encounter: 87.3 kg (192 lb 6.4 oz).  Medication Reconciliation: complete    Basilia Hennessy LPN    "

## 2018-12-15 ASSESSMENT — ANXIETY QUESTIONNAIRES: GAD7 TOTAL SCORE: 2

## 2018-12-28 DIAGNOSIS — N52.9 ERECTILE DYSFUNCTION, UNSPECIFIED ERECTILE DYSFUNCTION TYPE: ICD-10-CM

## 2018-12-28 DIAGNOSIS — Z20.2 POTENTIAL EXPOSURE TO STD: ICD-10-CM

## 2018-12-28 LAB — PSA SERPL-ACNC: 2.08 UG/L (ref 0–4)

## 2018-12-28 PROCEDURE — 84270 ASSAY OF SEX HORMONE GLOBUL: CPT | Mod: 90 | Performed by: NURSE PRACTITIONER

## 2018-12-28 PROCEDURE — 84403 ASSAY OF TOTAL TESTOSTERONE: CPT | Mod: 90 | Performed by: NURSE PRACTITIONER

## 2018-12-28 PROCEDURE — 36415 COLL VENOUS BLD VENIPUNCTURE: CPT | Performed by: NURSE PRACTITIONER

## 2018-12-28 PROCEDURE — 86780 TREPONEMA PALLIDUM: CPT | Mod: 90 | Performed by: NURSE PRACTITIONER

## 2018-12-28 PROCEDURE — 87389 HIV-1 AG W/HIV-1&-2 AB AG IA: CPT | Mod: 90 | Performed by: NURSE PRACTITIONER

## 2018-12-28 PROCEDURE — 84153 ASSAY OF PSA TOTAL: CPT | Performed by: NURSE PRACTITIONER

## 2018-12-28 PROCEDURE — 87591 N.GONORRHOEAE DNA AMP PROB: CPT | Performed by: NURSE PRACTITIONER

## 2018-12-28 PROCEDURE — 87491 CHLMYD TRACH DNA AMP PROBE: CPT | Performed by: NURSE PRACTITIONER

## 2018-12-28 PROCEDURE — 86803 HEPATITIS C AB TEST: CPT | Mod: 90 | Performed by: NURSE PRACTITIONER

## 2018-12-28 PROCEDURE — 99000 SPECIMEN HANDLING OFFICE-LAB: CPT | Performed by: NURSE PRACTITIONER

## 2018-12-29 LAB
C TRACH DNA SPEC QL PROBE+SIG AMP: NOT DETECTED
N GONORRHOEA DNA SPEC QL PROBE+SIG AMP: NOT DETECTED
SPECIMEN SOURCE: NORMAL
T PALLIDUM AB SER QL: NONREACTIVE

## 2018-12-30 LAB — HCV AB SERPL QL IA: NONREACTIVE

## 2018-12-31 LAB — HIV 1+2 AB+HIV1 P24 AG SERPL QL IA: NONREACTIVE

## 2020-07-08 NOTE — ADDENDUM NOTE
Addended by: TYRONE BUENO on: 12/28/2018 10:29 AM     Modules accepted: Ruel     Per patient although I cannot find results from [facility] her last TSH was last month and it was too and her old provider put her down from 100 mcg daily to 25 mcg daily  She and her pharmacist both thought this was drained so she has only been taking 50 mcg instead of 100 mcg  Recheck TSH along with thyroid antibodies and PTH

## 2021-06-29 NOTE — PROGRESS NOTES
3  SUBJECTIVE:   CC: Dorian Cabrera is an 35 year old male who presents for preventive health visit.       Patient has been advised of split billing requirements and indicates understanding: Yes  Healthy Habits:    Do you get at least three servings of calcium containing foods daily (dairy, green leafy vegetables, etc.)? yes    Amount of exercise or daily activities, outside of work: 6 day(s) per week    Problems taking medications regularly No    Medication side effects: No    Have you had an eye exam in the past two years? Yes     Do you see a dentist twice per year? no    Do you have sleep apnea, excessive snoring or daytime drowsiness?no        Today's PHQ-2 Score:   PHQ-2 (  [employer]) 2021   Q1: Little interest or pleasure in doing things 0 0   Q2: Feeling down, depressed or hopeless 0 0   PHQ-2 Score 0 0       Abuse: Current or Past(Physical, Sexual or Emotional)- No  Do you feel safe in your environment? Yes    Have you ever done Advance Care Planning? (For example, a Health Directive, POLST, or a discussion with a medical provider or your loved ones about your wishes): No, advance care planning information given to patient to review.  Patient declined advance care planning discussion at this time.    Social History     Tobacco Use     Smoking status: Former Smoker     Packs/day: 0.50     Years: 5.00     Pack years: 2.50     Types: Cigarettes     Quit date: 2014     Years since quittin.5     Smokeless tobacco: Never Used     Tobacco comment: quit dec 1 2014   Substance Use Topics     Alcohol use: Yes     Comment: 2 beers, rarely      If you drink alcohol do you typically have >3 drinks per day or >7 drinks per week? No endorses drinking 5-6 weekly  Denies illicit drug use.   Stopped smoking in . Denies current use of nicotine containing products including cigarettes, smokeless tobacco, e-cigarettes and vape pens.   stoppd smoking ,   Endorses caffeine consumption of 2-3  "cups of coffee a day.                        Last PSA:   PSA   Date Value Ref Range Status   12/28/2018 2.08 0 - 4 ug/L Final     Comment:     Assay Method:  Chemiluminescence using Siemens Vista analyzer       Reviewed orders with patient. Reviewed health maintenance and updated orders accordingly - Yes  Lab work is in process  Labs reviewed in EPIC    Reviewed and updated as needed this visit by clinical staff  Tobacco  Allergies  Meds              Reviewed and updated as needed this visit by Provider                Past Medical History:   Diagnosis Date     Anxiety state, unspecified 08/10/2012     Chronic rhinitis 6/16/2016     Gastroesophageal reflux disease without esophagitis 6/16/2016     Panic disorder without agoraphobia 10/22/2012      Past Surgical History:   Procedure Laterality Date     CIRCUMCISION       HEAD & NECK SURGERY      oral surgery     HERNIA REPAIR, INGUINAL RT/LT      RT      MOUTH SURGERY         ROS:  CONSTITUTIONAL: NEGATIVE for fever, chills, change in weight  INTEGUMENTARY/SKIN: NEGATIVE for worrisome rashes, moles or lesions  EYES: NEGATIVE for vision changes or irritation  ENT: NEGATIVE for ear, mouth and throat problems  RESP: NEGATIVE for significant cough or SOB  CV: NEGATIVE for chest pain, palpitations or peripheral edema  GI: NEGATIVE for nausea, abdominal pain, heartburn, or change in bowel habits   male: negative for dysuria, hematuria, decreased urinary stream, positive for erectile dysfunction  MUSCULOSKELETAL: NEGATIVE for significant arthralgias or myalgia  NEURO: NEGATIVE for weakness, dizziness or paresthesias  PSYCHIATRIC: NEGATIVE for changes in mood or affect    OBJECTIVE:   BP 98/60 (BP Location: Left arm, Patient Position: Chair, Cuff Size: Adult Regular)   Pulse 69   Temp 97.7  F (36.5  C) (Tympanic)   Resp 16   Ht 1.778 m (5' 10\")   Wt 83.1 kg (183 lb 3.2 oz)   SpO2 98%   BMI 26.29 kg/m       EXAM:  GENERAL: Dorian is a pleasant man who appears " healthy, alert and in no acute distress  EYES: Eye lids are without lesions, masses or drooping. Conjunctiva and sclera clear.  PERRLA.  HENT: External ear without lesions, erythema, and drainage, no tenderness to palpation. Ear canals clear with no cerumen. TM's appear gray and clear with no erythema, lesions or fluid in the middle ear space. Nasal septum midline, nasal mucosa appears pink and wet with no lesions erythema or discharge. Oropharyngeal: oral mucosa pink and wet without lesions, erythema including gums/buccal mucosa. Phayynx pink and wet without erythema, lesions or post-nasal drainage. No tonsillar hypertrophy.   NECK: no adenopathy, no asymmetry, masses, or scars. No thyromegaly, no masses or nocules to palpation. No cervical lymphadenopathy.   RESP: lungs clear to auscultation, no increased work in breathing. Regular rate, rhythm, depth and ease - no rales, rhonchi or wheezes  CV: regular rate and rhythm, clear S1 S2, no extra heart sounds. No murmurs, rubs or gallops. Capillary refill less than 2 seconds.No peripheral edema and peripheral pulses strong  ABDOMEN: Flat and non-distended, no scars or suspicious lesions. Active bowel sounds.  Soft and nontender to palpation. No hepatosplenomegaly, no masses.  MS: no gross musculoskeletal defects noted, no edema  SKIN: no suspicious lesions or rashes  NEURO: Normal strength and tone, mentation intact and speech normal  PSYCH: mentation appears normal, affect normal/bright    Diagnostic Test Results:  Labs reviewed in Epic    ASSESSMENT/PLAN:   1. Routine general medical examination at a health care facility  - Dorian continues to do well. No concerns presented on History or physical exam.  - Lipid Profile  - TSH with free T4 reflex  - Basic metabolic panel    2. Erectile dysfunction, unspecified erectile dysfunction type  - sildenafil (VIAGRA) 100 MG tablet; Take 1/2-1 tablet 30 minutes prior to intercourse  Dispense: 12 tablet; Refill: 5      Patient has  "been advised of split billing requirements and indicates understanding: Yes  COUNSELING:  Reviewed preventive health counseling, as reflected in patient instructions       Regular exercise       Healthy diet/nutrition       Vision screening       Alcohol Use     Estimated body mass index is 26.29 kg/m  as calculated from the following:    Height as of this encounter: 1.778 m (5' 10\").    Weight as of this encounter: 83.1 kg (183 lb 3.2 oz).    Weight management plan: Discussed healthy diet and exercise guidelines    He reports that he quit smoking about 6 years ago. His smoking use included cigarettes. He has a 2.50 pack-year smoking history. He has never used smokeless tobacco.      Counseling Resources:  ATP IV Guidelines  Pooled Cohorts Equation Calculator  FRAX Risk Assessment  ICSI Preventive Guidelines  Dietary Guidelines for Americans, 2010  USDA's MyPlate  ASA Prophylaxis  Lung CA Screening    Follow up in one year for an annual physical exam or sooner if needed.     BETTY ElliottS    Patient is seen in conjunction with PA student.  History is reviewed with patient and pertinent portions of the exam are repeated.  Assessment and plan is reviewed with the patient.    Ana Mckinney NP  [facility]  "

## 2021-06-29 NOTE — PATIENT INSTRUCTIONS
1. Routine general medical examination at a health care facility  - Lipid Profile  - TSH with free T4 reflex  - Basic metabolic panel    2. Erectile dysfunction, unspecified erectile dysfunction type  - sildenafil (VIAGRA) 100 MG tablet; Take 1/2-1 tablet 30 minutes prior to intercourse  Dispense: 12 tablet; Refill: 5    Follow-up annually or as needed    Ana Mckinney,   Certified Adult Nurse Practitioner  [phone]      Preventive Health Recommendations  Male Ages 26 - 39    Yearly exam:             See your health care provider every year in order to  o   Review health changes.   o   Discuss preventive care.    o   Review your medicines if your doctor has prescribed any.    You should be tested each year for STDs (sexually transmitted diseases), if you re at risk.     After age 35, talk to your provider about cholesterol testing. If you are at risk for heart disease, have your cholesterol tested at least every 5 years.     If you are at risk for diabetes, you should have a diabetes test (fasting glucose).  Shots: Get a flu shot each year. Get a tetanus shot every 10 years.     Nutrition:    Eat at least 5 servings of fruits and vegetables daily.     Eat whole-grain bread, whole-wheat pasta and brown rice instead of white grains and rice.     Get adequate Calcium and Vitamin D.     Lifestyle    Exercise for at least 150 minutes a week (30 minutes a day, 5 days a week). This will help you control your weight and prevent disease.     Limit alcohol to one drink per day.     No smoking.     Wear sunscreen to prevent skin cancer.     See your dentist every six months for an exam and cleaning.

## 2021-07-01 ENCOUNTER — OFFICE VISIT (OUTPATIENT)
Dept: FAMILY MEDICINE | Facility: OTHER | Age: 36
End: 2021-07-01
Attending: NURSE PRACTITIONER
Payer: COMMERCIAL

## 2021-07-01 VITALS
OXYGEN SATURATION: 98 % | BODY MASS INDEX: 26.23 KG/M2 | TEMPERATURE: 97.7 F | HEIGHT: 70 IN | SYSTOLIC BLOOD PRESSURE: 98 MMHG | DIASTOLIC BLOOD PRESSURE: 60 MMHG | HEART RATE: 69 BPM | RESPIRATION RATE: 16 BRPM | WEIGHT: 183.2 LBS

## 2021-07-01 DIAGNOSIS — N52.9 ERECTILE DYSFUNCTION, UNSPECIFIED ERECTILE DYSFUNCTION TYPE: ICD-10-CM

## 2021-07-01 DIAGNOSIS — Z00.00 ROUTINE GENERAL MEDICAL EXAMINATION AT A HEALTH CARE FACILITY: Primary | ICD-10-CM

## 2021-07-01 LAB
ANION GAP SERPL CALCULATED.3IONS-SCNC: 9 MMOL/L (ref 3–14)
BUN SERPL-MCNC: 16 MG/DL (ref 7–30)
CALCIUM SERPL-MCNC: 9.1 MG/DL (ref 8.5–10.1)
CHLORIDE SERPL-SCNC: 106 MMOL/L (ref 94–109)
CHOLEST SERPL-MCNC: 184 MG/DL
CO2 SERPL-SCNC: 24 MMOL/L (ref 20–32)
CREAT SERPL-MCNC: 0.96 MG/DL (ref 0.66–1.25)
GFR SERPL CREATININE-BSD FRML MDRD: >90 ML/MIN/{1.73_M2}
GLUCOSE SERPL-MCNC: 80 MG/DL (ref 70–99)
HDLC SERPL-MCNC: 67 MG/DL
LDLC SERPL CALC-MCNC: 107 MG/DL
NONHDLC SERPL-MCNC: 117 MG/DL
POTASSIUM SERPL-SCNC: 3.8 MMOL/L (ref 3.4–5.3)
SODIUM SERPL-SCNC: 139 MMOL/L (ref 133–144)
TRIGL SERPL-MCNC: 52 MG/DL
TSH SERPL DL<=0.005 MIU/L-ACNC: 3.3 MU/L (ref 0.4–4)

## 2021-07-01 PROCEDURE — 99395 PREV VISIT EST AGE 18-39: CPT | Performed by: NURSE PRACTITIONER

## 2021-07-01 PROCEDURE — 84443 ASSAY THYROID STIM HORMONE: CPT | Performed by: NURSE PRACTITIONER

## 2021-07-01 PROCEDURE — 80061 LIPID PANEL: CPT | Performed by: NURSE PRACTITIONER

## 2021-07-01 PROCEDURE — 36415 COLL VENOUS BLD VENIPUNCTURE: CPT | Performed by: NURSE PRACTITIONER

## 2021-07-01 PROCEDURE — 80048 BASIC METABOLIC PNL TOTAL CA: CPT | Performed by: NURSE PRACTITIONER

## 2021-07-01 RX ORDER — SILDENAFIL 100 MG/1
TABLET, FILM COATED ORAL
Qty: 12 TABLET | Refills: 5 | Status: SHIPPED | OUTPATIENT
Start: 2021-07-01 | End: 2021-08-18

## 2021-07-01 ASSESSMENT — MIFFLIN-ST. JEOR: SCORE: 1782.24

## 2021-07-01 ASSESSMENT — PAIN SCALES - GENERAL: PAINLEVEL: NO PAIN (0)

## 2021-07-01 NOTE — NURSING NOTE
"Chief Complaint   Patient presents with     Physical       Initial BP 98/60 (BP Location: Left arm, Patient Position: Chair, Cuff Size: Adult Regular)   Pulse 69   Temp 97.7  F (36.5  C) (Tympanic)   Resp 16   Ht 1.778 m (5' 10\")   Wt 83.1 kg (183 lb 3.2 oz)   SpO2 98%   BMI 26.29 kg/m   Estimated body mass index is 26.29 kg/m  as calculated from the following:    Height as of this encounter: 1.778 m (5' 10\").    Weight as of this encounter: 83.1 kg (183 lb 3.2 oz).  Medication Reconciliation: complete  Pamela M. Lechevalier, LPN    "

## 2021-07-12 ENCOUNTER — TELEPHONE (OUTPATIENT)
Dept: FAMILY MEDICINE | Facility: OTHER | Age: 36
End: 2021-07-12

## 2021-07-12 NOTE — TELEPHONE ENCOUNTER
Received a PA from [employer] for Sildenafil Citrate 100mg tablets. Submitted on CMM. Waiting for a response.

## 2021-07-15 NOTE — TELEPHONE ENCOUNTER
Received a DENIAL from [facility] for Sildenafil Citrate 100mg tablets.     Forms scanned to Epic.

## 2021-08-18 ENCOUNTER — TELEPHONE (OUTPATIENT)
Dept: FAMILY MEDICINE | Facility: OTHER | Age: 36
End: 2021-08-18

## 2021-08-18 DIAGNOSIS — N52.9 ERECTILE DYSFUNCTION, UNSPECIFIED ERECTILE DYSFUNCTION TYPE: ICD-10-CM

## 2021-08-18 RX ORDER — SILDENAFIL 100 MG/1
TABLET, FILM COATED ORAL
Qty: 12 TABLET | Refills: 5 | Status: SHIPPED | OUTPATIENT
Start: 2021-08-18 | End: 2023-02-20

## 2021-08-18 NOTE — TELEPHONE ENCOUNTER
Pharmacy called they could add instruction to take max of one dose per 24 hours. PCP to advise.    Pharmacy contact information:  [phone]

## 2022-08-19 ENCOUNTER — OFFICE VISIT (OUTPATIENT)
Dept: FAMILY MEDICINE | Facility: OTHER | Age: 37
End: 2022-08-19
Attending: NURSE PRACTITIONER
Payer: COMMERCIAL

## 2022-08-19 VITALS
TEMPERATURE: 96.8 F | OXYGEN SATURATION: 99 % | HEIGHT: 70 IN | SYSTOLIC BLOOD PRESSURE: 102 MMHG | WEIGHT: 195.8 LBS | BODY MASS INDEX: 28.03 KG/M2 | DIASTOLIC BLOOD PRESSURE: 62 MMHG | RESPIRATION RATE: 16 BRPM | HEART RATE: 53 BPM

## 2022-08-19 DIAGNOSIS — F41.0 PANIC DISORDER WITHOUT AGORAPHOBIA: ICD-10-CM

## 2022-08-19 DIAGNOSIS — Z00.00 ANNUAL PHYSICAL EXAM: Primary | ICD-10-CM

## 2022-08-19 DIAGNOSIS — F41.9 ANXIETY: ICD-10-CM

## 2022-08-19 PROCEDURE — 99395 PREV VISIT EST AGE 18-39: CPT | Performed by: NURSE PRACTITIONER

## 2022-08-19 RX ORDER — LORAZEPAM 0.5 MG/1
0.5 TABLET ORAL DAILY PRN
Qty: 20 TABLET | Refills: 0 | Status: SHIPPED | OUTPATIENT
Start: 2022-08-19

## 2022-08-19 ASSESSMENT — ENCOUNTER SYMPTOMS
PALPITATIONS: 0
NERVOUS/ANXIOUS: 0
ABDOMINAL PAIN: 0
SHORTNESS OF BREATH: 0
DIZZINESS: 0
HEMATOCHEZIA: 0
FREQUENCY: 0
EYE PAIN: 0
CHILLS: 0
HEMATURIA: 0
WEAKNESS: 0
CONSTIPATION: 0
JOINT SWELLING: 0
DYSURIA: 0
ARTHRALGIAS: 0
HEADACHES: 0
SORE THROAT: 0
COUGH: 0
PARESTHESIAS: 0
NAUSEA: 0
MYALGIAS: 0
HEARTBURN: 0
DIARRHEA: 0
FEVER: 0

## 2022-08-19 ASSESSMENT — PAIN SCALES - GENERAL: PAINLEVEL: NO PAIN (0)

## 2022-08-19 NOTE — PROGRESS NOTES
SUBJECTIVE:   CC: Dorian Carbera is an 36 year old male who presents for preventative health visit.   Patient has been advised of split billing requirements and indicates understanding: Yes  Healthy Habits:     Getting at least 3 servings of Calcium per day:  Yes    Bi-annual eye exam:  Yes    Dental care twice a year:  Yes    Sleep apnea or symptoms of sleep apnea:  None    Diet:  Regular (no restrictions)    Frequency of exercise:  2-3 days/week    Duration of exercise:  Greater than 60 minutes    Taking medications regularly:  Yes    Medication side effects:  Not applicable    PHQ-2 Total Score: 0    Additional concerns today:  No      Requesting refill of ativan that he uses as needed for acute anxiety/panic attacks.  He has not used it in several months but likes to have it on hand for when he needs it.      Today's PHQ-2 Score:   PHQ-2 (  [employer]) 2022   Q1: Little interest or pleasure in doing things 0   Q2: Feeling down, depressed or hopeless 0   PHQ-2 Score 0   PHQ-2 Total Score (12-17 Years)- Positive if 3 or more points; Administer PHQ-A if positive -   Q1: Little interest or pleasure in doing things Not at all   Q2: Feeling down, depressed or hopeless Not at all   PHQ-2 Score 0       Abuse: Current or Past(Physical, Sexual or Emotional)- No  Do you feel safe in your environment? Yes        Social History     Tobacco Use     Smoking status: Former Smoker     Packs/day: 0.50     Years: 5.00     Pack years: 2.50     Types: Cigarettes     Quit date: 2014     Years since quittin.7     Smokeless tobacco: Never Used     Tobacco comment: quit dec 1 2014   Substance Use Topics     Alcohol use: Yes     Comment: 2 beers, rarely      If you drink alcohol do you typically have >3 drinks per day or >7 drinks per week? No    Alcohol Use 2022   Prescreen: >3 drinks/day or >7 drinks/week? No   No flowsheet data found.    Last PSA:   PSA   Date Value Ref Range Status   2018 2.08 0 - 4 ug/L  "Final     Comment:     Assay Method:  Chemiluminescence using Siemens Vista analyzer       Reviewed orders with patient. Reviewed health maintenance and updated orders accordingly - Yes  Labs reviewed in EPIC    Reviewed and updated as needed this visit by clinical staff   Tobacco  Allergies                 Reviewed and updated as needed this visit by Provider                       Review of Systems  CONSTITUTIONAL: NEGATIVE for fever, chills, change in weight  INTEGUMENTARY/SKIN: NEGATIVE for worrisome rashes, moles or lesions  EYES: NEGATIVE for vision changes or irritation, vision changes  ENT: NEGATIVE for ear, mouth and throat problems, changes in hearing  RESP: NEGATIVE for significant cough or SOB  CV: NEGATIVE for chest pain, palpitations or peripheral edema  GI: NEGATIVE for nausea, abdominal pain, heartburn, or change in bowel habits   male: negative for dysuria, hematuria, decreased urinary stream, erectile dysfunction, urethral discharge  MUSCULOSKELETAL: NEGATIVE for significant arthralgias or myalgia  NEURO: NEGATIVE for weakness, dizziness or paresthesias  PSYCHIATRIC: NEGATIVE for changes in mood or affect    OBJECTIVE:   /62 (BP Location: Left arm, Patient Position: Chair, Cuff Size: Adult Regular)   Pulse 53   Temp 96.8  F (36  C) (Tympanic)   Resp 16   Ht 1.778 m (5' 10\")   Wt 88.8 kg (195 lb 12.8 oz)   SpO2 99%   BMI 28.09 kg/m      Physical Exam  GENERAL: healthy, alert and no distress  EYES: Eyes grossly normal to inspection, PERRL and conjunctivae and sclerae normal  HENT: ear canals and TM's normal, nose and mouth without ulcers or lesions  NECK: no adenopathy, no asymmetry, masses, or scars and thyroid normal to palpation  RESP: lungs clear to auscultation - no rales, rhonchi or wheezes  CV: regular rate and rhythm, normal S1 S2, no S3 or S4, no murmur, click or rub, no peripheral edema and peripheral pulses strong  ABDOMEN: soft, nontender, no hepatosplenomegaly, no masses " "and bowel sounds normal  MS: no gross musculoskeletal defects noted, no edema  NEURO: Normal strength and tone, mentation intact and speech normal  LYMPH: no cervical, supraclavicular, axillary, or inguinal adenopathy        ASSESSMENT/PLAN:   1. Annual physical exam  Exam completed.     2. Anxiety  - LORazepam (ATIVAN) 0.5 MG tablet; Take 1 tablet (0.5 mg) by mouth daily as needed for anxiety Do not drive after taking this medication  Dispense: 20 tablet; Refill: 0    3. Panic disorder without agoraphobia  - LORazepam (ATIVAN) 0.5 MG tablet; Take 1 tablet (0.5 mg) by mouth daily as needed for anxiety Do not drive after taking this medication  Dispense: 20 tablet; Refill: 0        COUNSELING:   Reviewed preventive health counseling, as reflected in patient instructions       Regular exercise       Healthy diet/nutrition       Vision screening       Hearing screening       Immunizations    Tetanus due in November, declined today.           Estimated body mass index is 28.09 kg/m  as calculated from the following:    Height as of this encounter: 1.778 m (5' 10\").    Weight as of this encounter: 88.8 kg (195 lb 12.8 oz).       He reports that he quit smoking about 7 years ago. His smoking use included cigarettes. He has a 2.50 pack-year smoking history. He has never used smokeless tobacco.      Counseling Resources:  ATP IV Guidelines  Pooled Cohorts Equation Calculator  FRAX Risk Assessment  ICSI Preventive Guidelines  Dietary Guidelines for Americans, 2010  USDA's MyPlate  ASA Prophylaxis  Lung CA Screening    Ana Mckinney NP  [facility]  "

## 2022-08-19 NOTE — NURSING NOTE
"Chief Complaint   Patient presents with     Physical       Initial /62 (BP Location: Left arm, Patient Position: Chair, Cuff Size: Adult Regular)   Pulse 53   Temp 96.8  F (36  C) (Tympanic)   Resp 16   Ht 1.778 m (5' 10\")   Wt 88.8 kg (195 lb 12.8 oz)   SpO2 99%   BMI 28.09 kg/m   Estimated body mass index is 28.09 kg/m  as calculated from the following:    Height as of this encounter: 1.778 m (5' 10\").    Weight as of this encounter: 88.8 kg (195 lb 12.8 oz).  Medication Reconciliation: complete  Pamela M. Lechevalier, LPN    "

## 2022-10-10 ENCOUNTER — OFFICE VISIT (OUTPATIENT)
Dept: FAMILY MEDICINE | Facility: OTHER | Age: 37
End: 2022-10-10
Attending: NURSE PRACTITIONER
Payer: COMMERCIAL

## 2022-10-10 VITALS
TEMPERATURE: 96.9 F | HEIGHT: 70 IN | SYSTOLIC BLOOD PRESSURE: 114 MMHG | HEART RATE: 71 BPM | RESPIRATION RATE: 16 BRPM | OXYGEN SATURATION: 98 % | BODY MASS INDEX: 27.89 KG/M2 | DIASTOLIC BLOOD PRESSURE: 68 MMHG | WEIGHT: 194.8 LBS

## 2022-10-10 DIAGNOSIS — J20.9 ACUTE BRONCHITIS WITH SYMPTOMS GREATER THAN 10 DAYS: ICD-10-CM

## 2022-10-10 DIAGNOSIS — J01.90 ACUTE SINUSITIS WITH SYMPTOMS GREATER THAN 10 DAYS: Primary | ICD-10-CM

## 2022-10-10 PROCEDURE — 99213 OFFICE O/P EST LOW 20 MIN: CPT | Performed by: NURSE PRACTITIONER

## 2022-10-10 ASSESSMENT — PAIN SCALES - GENERAL: PAINLEVEL: NO PAIN (0)

## 2022-10-10 NOTE — NURSING NOTE
"Chief Complaint   Patient presents with     Cough       Initial /68 (BP Location: Left arm, Patient Position: Chair, Cuff Size: Adult Regular)   Pulse 71   Temp 96.9  F (36.1  C) (Tympanic)   Resp 16   Ht 1.778 m (5' 10\")   Wt 88.4 kg (194 lb 12.8 oz)   SpO2 98%   BMI 27.95 kg/m   Estimated body mass index is 27.95 kg/m  as calculated from the following:    Height as of this encounter: 1.778 m (5' 10\").    Weight as of this encounter: 88.4 kg (194 lb 12.8 oz).  Medication Reconciliation: complete  Pamela M. Lechevalier, LPN    "

## 2022-10-10 NOTE — PROGRESS NOTES
"  Assessment & Plan     1. Acute sinusitis with symptoms greater than 10 days  - amoxicillin-clavulanate (AUGMENTIN) 875-125 MG tablet; Take 1 tablet by mouth 2 times daily for 10 days  Dispense: 20 tablet; Refill: 0    2. Acute bronchitis with symptoms greater than 10 days  Declined inhaler for now.   - amoxicillin-clavulanate (AUGMENTIN) 875-125 MG tablet; Take 1 tablet by mouth 2 times daily for 10 days  Dispense: 20 tablet; Refill: 0             BMI:   Estimated body mass index is 27.95 kg/m  as calculated from the following:    Height as of this encounter: 1.778 m (5' 10\").    Weight as of this encounter: 88.4 kg (194 lb 12.8 oz).   Weight management plan: Discussed healthy diet and exercise guidelines    Follow-up if no improvement or any worsening.       Ana Mckinney NP  [facility] - [place] YOMI Alarcon is a 36 year old, presenting for the following health issues:  Cough      HPI     Acute Illness  Acute illness concerns: cough   Onset/Duration: started with sinus congestion 2 weeks ago, worsened on Saturday to continued sinus pressure and down into chest with wheezing and cough.    Symptoms:  Fever: No  Chills/Sweats: No  Headache (location?): No  Sinus Pressure: YES  Conjunctivitis:  No  Ear Pain: no  Rhinorrhea: YES  Congestion: YES  Sore Throat: did but not last 2 nights   Cough: YES-productive of green sputum  Wheeze: YES- in am and at night   Decreased Appetite: No  Nausea: No  Vomiting: No  Diarrhea: No  Dysuria/Freq.: No  Dysuria or Hematuria: No  Fatigue/Achiness: YES  Sick/Strep Exposure: No  Therapies tried and outcome: Advil cold and sinus and Musinex did 5 at home covid tests in the past 5 days all negative; works in school district         Recent Labs   Lab Test 07/01/21  1521 10/12/15  1504   *  --    HDL 67  --    TRIG 52  --    CR 0.96  --    GFRESTIMATED >90  --    GFRESTBLACK >90  --    POTASSIUM 3.8  --    TSH 3.30 2.92      BP Readings from Last 3 " "Encounters:   10/10/22 114/68   08/19/22 102/62   07/01/21 98/60    Wt Readings from Last 3 Encounters:   10/10/22 88.4 kg (194 lb 12.8 oz)   08/19/22 88.8 kg (195 lb 12.8 oz)   07/01/21 83.1 kg (183 lb 3.2 oz)                      Review of Systems   Constitutional, HEENT, cardiovascular, pulmonary, gi and gu systems are negative, except as otherwise noted.      Objective    /68 (BP Location: Left arm, Patient Position: Chair, Cuff Size: Adult Regular)   Pulse 71   Temp 96.9  F (36.1  C) (Tympanic)   Resp 16   Ht 1.778 m (5' 10\")   Wt 88.4 kg (194 lb 12.8 oz)   SpO2 98%   BMI 27.95 kg/m    Body mass index is 27.95 kg/m .  Physical Exam   GENERAL: healthy, alert and no distress  HENT: normal cephalic/atraumatic, both ears: clear effusion, nasal mucosa edematous  and rhinorrhea thick, throat with thick post nasal drainage. Maxillary sinus tender  NECK: no adenopathy, no asymmetry, masses, or scars and thyroid normal to palpation  RESP: mild wheezing throughout.   CV: regular rate and rhythm, normal S1 S2, no S3 or S4, no murmur, click or rub, no peripheral edema and peripheral pulses strong  MS: no gross musculoskeletal defects noted, no edema  PSYCH: mentation appears normal, affect normal/bright                    "

## 2023-02-20 ENCOUNTER — OFFICE VISIT (OUTPATIENT)
Dept: FAMILY MEDICINE | Facility: OTHER | Age: 38
End: 2023-02-20
Attending: NURSE PRACTITIONER
Payer: COMMERCIAL

## 2023-02-20 VITALS
HEART RATE: 85 BPM | OXYGEN SATURATION: 99 % | WEIGHT: 194.7 LBS | DIASTOLIC BLOOD PRESSURE: 66 MMHG | BODY MASS INDEX: 27.87 KG/M2 | SYSTOLIC BLOOD PRESSURE: 110 MMHG | RESPIRATION RATE: 16 BRPM | HEIGHT: 70 IN | TEMPERATURE: 97.1 F

## 2023-02-20 DIAGNOSIS — N52.9 ERECTILE DYSFUNCTION, UNSPECIFIED ERECTILE DYSFUNCTION TYPE: ICD-10-CM

## 2023-02-20 DIAGNOSIS — J01.00 ACUTE NON-RECURRENT MAXILLARY SINUSITIS: Primary | ICD-10-CM

## 2023-02-20 PROCEDURE — 99213 OFFICE O/P EST LOW 20 MIN: CPT | Performed by: NURSE PRACTITIONER

## 2023-02-20 RX ORDER — SILDENAFIL 100 MG/1
TABLET, FILM COATED ORAL
Qty: 12 TABLET | Refills: 5 | Status: SHIPPED | OUTPATIENT
Start: 2023-02-20 | End: 2023-11-29

## 2023-02-20 ASSESSMENT — PAIN SCALES - GENERAL: PAINLEVEL: NO PAIN (0)

## 2023-02-20 NOTE — PROGRESS NOTES
Assessment & Plan     1. Acute non-recurrent maxillary sinusitis  Symptomatic cares  - amoxicillin-clavulanate (AUGMENTIN) 875-125 MG tablet; Take 1 tablet by mouth 2 times daily for 10 days  Dispense: 20 tablet; Refill: 0    2. Erectile dysfunction, unspecified erectile dysfunction type  - sildenafil (VIAGRA) 100 MG tablet; Take 1/2-1 tablet 30 minutes prior to intercourse  Dispense: 12 tablet; Refill: 5      Follow-up if no improvement or any worsening.     Ana Mckinney NP  [facility] - [place] YOMI Alarcon is a 37 year old, presenting for the following health issues:  Sinus Problem      HPI     Acute Illness  Acute illness concerns: sinus issues   Onset/Duration: cold symptoms late last week worse Saturday   Symptoms:  Fever: No  Chills/Sweats: No  Headache (location?): No  Sinus Pressure: YES  Conjunctivitis:  YES- watery   Ear Pain: no  Rhinorrhea: YES  Congestion: YES  Sore Throat: not today but last 2 days prior did   Cough: YES-productive of green sputum  Wheeze: No  Decreased Appetite: YES  Nausea: No  Vomiting: No  Diarrhea: No  Dysuria/Freq.: No  Dysuria or Hematuria: No  Fatigue/Achiness: YES  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      Recent Labs   Lab Test 07/01/21  1521 10/12/15  1504   *  --    HDL 67  --    TRIG 52  --    CR 0.96  --    GFRESTIMATED >90  --    GFRESTBLACK >90  --    POTASSIUM 3.8  --    TSH 3.30 2.92      BP Readings from Last 3 Encounters:   02/20/23 110/66   10/10/22 114/68   08/19/22 102/62    Wt Readings from Last 3 Encounters:   02/20/23 88.3 kg (194 lb 11.2 oz)   10/10/22 88.4 kg (194 lb 12.8 oz)   08/19/22 88.8 kg (195 lb 12.8 oz)                      Review of Systems   Constitutional, HEENT, cardiovascular, pulmonary, gi and gu systems are negative, except as otherwise noted.      Objective    /66 (BP  "Location: Left arm, Patient Position: Chair, Cuff Size: Adult Regular)   Pulse 85   Temp 97.1  F (36.2  C) (Tympanic)   Resp 16   Ht 1.778 m (5' 10\")   Wt 88.3 kg (194 lb 11.2 oz)   SpO2 99%   BMI 27.94 kg/m    Body mass index is 27.94 kg/m .  Physical Exam   GENERAL: healthy, alert and no distress  HENT: normal cephalic/atraumatic, both ears: erythematous, nasal mucosa edematous  and rhinorrhea thick, throat with thick green post nasal discharge.  Maxillary sinus tender.    NECK: no adenopathy, no asymmetry, masses, or scars and thyroid normal to palpation  RESP: lungs clear to auscultation - no rales, rhonchi or wheezes  CV: regular rate and rhythm, normal S1 S2, no S3 or S4, no murmur, click or rub, no peripheral edema and peripheral pulses strong  MS: no gross musculoskeletal defects noted, no edema  PSYCH: mentation appears normal, affect normal/bright                    "

## 2023-02-20 NOTE — PATIENT INSTRUCTIONS
Assessment & Plan     1. Acute non-recurrent maxillary sinusitis  Symptomatic cares  - amoxicillin-clavulanate (AUGMENTIN) 875-125 MG tablet; Take 1 tablet by mouth 2 times daily for 10 days  Dispense: 20 tablet; Refill: 0    2. Erectile dysfunction, unspecified erectile dysfunction type  - sildenafil (VIAGRA) 100 MG tablet; Take 1/2-1 tablet 30 minutes prior to intercourse  Dispense: 12 tablet; Refill: 5      Follow-up if no improvement or any worsening.     Ana Mckinney NP  [facility]

## 2023-03-17 ENCOUNTER — OFFICE VISIT (OUTPATIENT)
Dept: FAMILY MEDICINE | Facility: OTHER | Age: 38
End: 2023-03-17
Attending: NURSE PRACTITIONER
Payer: COMMERCIAL

## 2023-03-17 VITALS
DIASTOLIC BLOOD PRESSURE: 68 MMHG | RESPIRATION RATE: 16 BRPM | HEART RATE: 81 BPM | OXYGEN SATURATION: 98 % | HEIGHT: 70 IN | BODY MASS INDEX: 27.93 KG/M2 | TEMPERATURE: 97.5 F | SYSTOLIC BLOOD PRESSURE: 120 MMHG | WEIGHT: 195.1 LBS

## 2023-03-17 DIAGNOSIS — J02.9 ACUTE PHARYNGITIS, UNSPECIFIED ETIOLOGY: Primary | ICD-10-CM

## 2023-03-17 DIAGNOSIS — R07.0 THROAT PAIN: ICD-10-CM

## 2023-03-17 LAB
DEPRECATED S PYO AG THROAT QL EIA: NEGATIVE
GROUP A STREP BY PCR: NOT DETECTED

## 2023-03-17 PROCEDURE — 99213 OFFICE O/P EST LOW 20 MIN: CPT | Performed by: NURSE PRACTITIONER

## 2023-03-17 PROCEDURE — 87651 STREP A DNA AMP PROBE: CPT | Performed by: NURSE PRACTITIONER

## 2023-03-17 RX ORDER — AZITHROMYCIN 250 MG/1
TABLET, FILM COATED ORAL
Qty: 6 TABLET | Refills: 0 | Status: SHIPPED | OUTPATIENT
Start: 2023-03-17 | End: 2023-03-22

## 2023-03-17 ASSESSMENT — PAIN SCALES - GENERAL: PAINLEVEL: EXTREME PAIN (9)

## 2023-03-17 NOTE — PROGRESS NOTES
Assessment & Plan     1. Acute pharyngitis, unspecified etiology  Ibuprofen for pain or fever  Cold medications per package directions.  - azithromycin (ZITHROMAX) 250 MG tablet; Take 2 tablets (500 mg) by mouth daily for 1 day, THEN 1 tablet (250 mg) daily for 4 days.  Dispense: 6 tablet; Refill: 0    2. Throat pain  Rapid strep negative.   - Streptococcus A Rapid Scr w Reflx to PCR ([facility]/[place] Only)  - Group A Streptococcus PCR Throat Swab      follow-up if no improvement or any worsening.     Ana Mckinney NP  [facility]    Mercy Alarcon is a 37 year old, presenting for the following health issues:  Throat Pain      HPI     Acute Illness  Acute illness concerns: Sore throat   Onset/Duration: 5 days   Symptoms:  Fever: No  Chills/Sweats: YES  Headache (location?): YES  Sinus Pressure: No  Conjunctivitis:  No  Ear Pain: YES: bilateral  Rhinorrhea: YES  Congestion: YES- in morning   Sore Throat: YES  Cough: YES-productive of yellow sputum  Wheeze: No  Decreased Appetite: No  Nausea: No  Vomiting: No  Diarrhea: No  Dysuria/Freq.: No  Dysuria or Hematuria: No  Fatigue/Achiness: YES  Sick/Strep Exposure: No  Therapies tried and outcome: cold and sinus hot showers heating pad on neck ice cream throat spray 2 covid tests at home both negative       Recent Labs   Lab Test 07/01/21  1521 10/12/15  1504   *  --    HDL 67  --    TRIG 52  --    CR 0.96  --    GFRESTIMATED >90  --    GFRESTBLACK >90  --    POTASSIUM 3.8  --    TSH 3.30 2.92      BP Readings from Last 3 Encounters:   03/17/23 120/68   02/20/23 110/66   10/10/22 114/68    Wt Readings from Last 3 Encounters:   03/17/23 88.5 kg (195 lb 1.6 oz)   02/20/23 88.3 kg (194 lb 11.2 oz)   10/10/22 88.4 kg (194 lb 12.8 oz)                      Review of Systems   Constitutional, HEENT, cardiovascular, pulmonary, gi and gu systems are negative, except as otherwise noted.      Objective    /68 (BP Location: Right  "arm, Patient Position: Chair, Cuff Size: Adult Regular)   Pulse 81   Temp 97.5  F (36.4  C) (Tympanic)   Resp 16   Ht 1.778 m (5' 10\")   Wt 88.5 kg (195 lb 1.6 oz)   SpO2 98%   BMI 27.99 kg/m    Body mass index is 27.99 kg/m .  Physical Exam   GENERAL: healthy, alert and no distress  HENT: normal cephalic/atraumatic, both ears: erythematous, nasal mucosa edematous , rhinorrhea clear, tonsillar hypertrophy and tonsillar erythema  NECK: bilateral anterior cervical adenopathy and thyroid normal to palpation  RESP: lungs clear to auscultation - no rales, rhonchi or wheezes  CV: regular rate and rhythm, normal S1 S2, no S3 or S4, no murmur, click or rub, no peripheral edema and peripheral pulses strong  MS: no gross musculoskeletal defects noted, no edema  PSYCH: mentation appears normal, affect normal/bright        Results for orders placed or performed in visit on 03/17/23   Streptococcus A Rapid Scr w Reflx to PCR ([facility] Only)     Status: Normal    Specimen: Throat; Swab   Result Value Ref Range    Group A Strep antigen Negative Negative                 "

## 2023-07-10 ENCOUNTER — TELEPHONE (OUTPATIENT)
Dept: FAMILY MEDICINE | Facility: OTHER | Age: 38
End: 2023-07-10

## 2023-07-10 NOTE — TELEPHONE ENCOUNTER
Patient is requesting for an overbook asap with provider.  Please call [phone]        ER F/U  7/723  Lower back pain    [facility].    Was told to call his primary Monday if symptoms did not improve.

## 2023-07-10 NOTE — PROGRESS NOTES
"  Assessment & Plan     1. Strain of lumbar region, subsequent encounter  Ice alternating with neat  - Physical Therapy Referral; Future  - oxyCODONE-acetaminophen (PERCOCET) 5-325 MG tablet; Take 1 tablet by mouth 2 times daily as needed for severe pain  Dispense: 30 tablet; Refill: 0  - methocarbamol (ROBAXIN) 750 MG tablet; Take 1 tablet (750 mg) by mouth 3 times daily as needed for muscle spasms  Dispense: 60 tablet; Refill: 0  - ibuprofen (ADVIL/MOTRIN) 800 MG tablet; Take 1 tablet (800 mg) by mouth 3 times daily as needed for moderate pain  Dispense: 60 tablet; Refill: 1    2. Acute left-sided low back pain with left-sided sciatica  - Physical Therapy Referral; Future  - oxyCODONE-acetaminophen (PERCOCET) 5-325 MG tablet; Take 1 tablet by mouth 2 times daily as needed for severe pain  Dispense: 30 tablet; Refill: 0  - methocarbamol (ROBAXIN) 750 MG tablet; Take 1 tablet (750 mg) by mouth 3 times daily as needed for muscle spasms  Dispense: 60 tablet; Refill: 0  - ibuprofen (ADVIL/MOTRIN) 800 MG tablet; Take 1 tablet (800 mg) by mouth 3 times daily as needed for moderate pain  Dispense: 60 tablet; Refill: 1      Review of prior external note(s) from - CareEverywhere information from ED [facility] notes and lumbar xr reviewed reviewed       MED REC REQUIRED  Post Medication Reconciliation Status: discharge medications reconciled and changed, per note/orders  BMI:   Estimated body mass index is 26.83 kg/m  as calculated from the following:    Height as of this encounter: 1.778 m (5' 10\").    Weight as of this encounter: 84.8 kg (187 lb).     Follow-up if no improvement or any worsening.  Will then consider MRI    Ana Mckinney NP  [facility] - [place] YOM[place] Alarcon is a 37 year old, presenting for the following health issues:  ER F/U          HPI     ED/UC Followup:    Facility:  [facility]   Date of visit: 7/7/23  Reason for visit: Lumbar sprain Radicular pain of both lower " "extremities   Current Status: right side has gotten a little better left side about the same     He continues to experience left sided low back pain with spasm and left radicular pain with numbness/tingling.  He has been taking robaxin and ibuprofen only as needed.  Taking oxycodone twice daily which takes the edge off so he can do what he needs to get done and to sleep.  He has been using elizabeth and alternating with heat.        Review of Systems   Constitutional, HEENT, cardiovascular, pulmonary, gi and gu systems are negative, except as otherwise noted.      Objective    /58 (BP Location: Left arm, Patient Position: Chair, Cuff Size: Adult Regular)   Pulse 51   Temp 96.8  F (36  C) (Tympanic)   Resp 16   Ht 1.778 m (5' 10\")   Wt 84.8 kg (187 lb)   SpO2 98%   BMI 26.83 kg/m    Body mass index is 26.83 kg/m .  Physical Exam   GENERAL: healthy, alert and no distress  MS: using walker with ambulation.   Comprehensive back pain exam:  Tenderness of lumbar paraspinal muscles in spasm, Pain limits the following motions: forward bending and twisting.  , Lower extremity strength functional and equal on both sides, Lower extremity sensation normal and equal on both sides and Straight leg raise negative bilaterally  PSYCH: mentation appears normal, affect normal/bright            EXAM: XR LUMBAR SPINE 2 OR 3 VIEWS     CLINICAL INFORMATION: 37 years old Male patient with low back pain x2 days, no trauma.     TECHNICAL INFORMATION: Frontal and lateral views of the lumbar spine were obtained.     COMPARISON: None     IMPRESSION: There were 5 nonrib-bearing lumbar-type vertebral segments. Alignment of the lumbar spine was normal. The lumbar vertebral body heights and disc spaces were maintained. No evidence of fracture or dislocation. Mild T12-L1 disc space narrowing. Mild multilevel endplate degeneration. Mild L5-S1 facet arthrosis. Bilateral SI joints and pubic symphysis were normal.     Electronically Signed: Dev " Wilma 7/7/2023 11:42 AM

## 2023-07-12 ENCOUNTER — OFFICE VISIT (OUTPATIENT)
Dept: FAMILY MEDICINE | Facility: OTHER | Age: 38
End: 2023-07-12
Attending: NURSE PRACTITIONER
Payer: COMMERCIAL

## 2023-07-12 VITALS
TEMPERATURE: 96.8 F | WEIGHT: 187 LBS | RESPIRATION RATE: 16 BRPM | HEIGHT: 70 IN | DIASTOLIC BLOOD PRESSURE: 58 MMHG | BODY MASS INDEX: 26.77 KG/M2 | HEART RATE: 51 BPM | OXYGEN SATURATION: 98 % | SYSTOLIC BLOOD PRESSURE: 110 MMHG

## 2023-07-12 DIAGNOSIS — M54.42 ACUTE LEFT-SIDED LOW BACK PAIN WITH LEFT-SIDED SCIATICA: ICD-10-CM

## 2023-07-12 DIAGNOSIS — S39.012D STRAIN OF LUMBAR REGION, SUBSEQUENT ENCOUNTER: Primary | ICD-10-CM

## 2023-07-12 PROCEDURE — 99213 OFFICE O/P EST LOW 20 MIN: CPT | Performed by: NURSE PRACTITIONER

## 2023-07-12 RX ORDER — OXYCODONE AND ACETAMINOPHEN 5; 325 MG/1; MG/1
TABLET ORAL
COMMUNITY
Start: 2023-07-07 | End: 2023-07-12

## 2023-07-12 RX ORDER — IBUPROFEN 800 MG/1
800 TABLET, FILM COATED ORAL
COMMUNITY
Start: 2023-07-07 | End: 2023-07-12

## 2023-07-12 RX ORDER — METHOCARBAMOL 750 MG/1
750 TABLET, FILM COATED ORAL 3 TIMES DAILY PRN
Qty: 60 TABLET | Refills: 0 | Status: SHIPPED | OUTPATIENT
Start: 2023-07-12

## 2023-07-12 RX ORDER — OXYCODONE AND ACETAMINOPHEN 5; 325 MG/1; MG/1
1 TABLET ORAL 2 TIMES DAILY PRN
Qty: 30 TABLET | Refills: 0 | Status: SHIPPED | OUTPATIENT
Start: 2023-07-12 | End: 2023-07-19

## 2023-07-12 RX ORDER — IBUPROFEN 800 MG/1
800 TABLET, FILM COATED ORAL 3 TIMES DAILY PRN
Qty: 60 TABLET | Refills: 1 | Status: SHIPPED | OUTPATIENT
Start: 2023-07-12

## 2023-07-12 RX ORDER — METHOCARBAMOL 750 MG/1
TABLET, FILM COATED ORAL
COMMUNITY
Start: 2023-07-07 | End: 2023-07-12

## 2023-07-12 ASSESSMENT — PAIN SCALES - GENERAL: PAINLEVEL: WORST PAIN (10)

## 2023-07-12 NOTE — PATIENT INSTRUCTIONS
Assessment & Plan     1. Strain of lumbar region, subsequent encounter  Ice alternating with neat  - Physical Therapy Referral; Future  - oxyCODONE-acetaminophen (PERCOCET) 5-325 MG tablet; Take 1 tablet by mouth 2 times daily as needed for severe pain  Dispense: 30 tablet; Refill: 0  - methocarbamol (ROBAXIN) 750 MG tablet; Take 1 tablet (750 mg) by mouth 3 times daily as needed for muscle spasms  Dispense: 60 tablet; Refill: 0  - ibuprofen (ADVIL/MOTRIN) 800 MG tablet; Take 1 tablet (800 mg) by mouth 3 times daily as needed for moderate pain  Dispense: 60 tablet; Refill: 1    2. Acute left-sided low back pain with left-sided sciatica  - Physical Therapy Referral; Future  - oxyCODONE-acetaminophen (PERCOCET) 5-325 MG tablet; Take 1 tablet by mouth 2 times daily as needed for severe pain  Dispense: 30 tablet; Refill: 0  - methocarbamol (ROBAXIN) 750 MG tablet; Take 1 tablet (750 mg) by mouth 3 times daily as needed for muscle spasms  Dispense: 60 tablet; Refill: 0  - ibuprofen (ADVIL/MOTRIN) 800 MG tablet; Take 1 tablet (800 mg) by mouth 3 times daily as needed for moderate pain  Dispense: 60 tablet; Refill: 1    Follow-up if no improvement, will consider MRI    Ana Mckinney,   Certified Adult Nurse Practitioner  [phone]

## 2023-07-19 ENCOUNTER — TELEPHONE (OUTPATIENT)
Dept: FAMILY MEDICINE | Facility: OTHER | Age: 38
End: 2023-07-19

## 2023-07-19 ENCOUNTER — THERAPY VISIT (OUTPATIENT)
Dept: PHYSICAL THERAPY | Facility: OTHER | Age: 38
End: 2023-07-19
Attending: NURSE PRACTITIONER
Payer: COMMERCIAL

## 2023-07-19 DIAGNOSIS — M54.42 ACUTE LEFT-SIDED LOW BACK PAIN WITH LEFT-SIDED SCIATICA: ICD-10-CM

## 2023-07-19 DIAGNOSIS — S39.012D STRAIN OF LUMBAR REGION, SUBSEQUENT ENCOUNTER: ICD-10-CM

## 2023-07-19 PROCEDURE — 97110 THERAPEUTIC EXERCISES: CPT | Mod: GP

## 2023-07-19 PROCEDURE — 97162 PT EVAL MOD COMPLEX 30 MIN: CPT | Mod: GP

## 2023-07-19 RX ORDER — OXYCODONE AND ACETAMINOPHEN 5; 325 MG/1; MG/1
1 TABLET ORAL 2 TIMES DAILY PRN
Qty: 14 TABLET | Refills: 0 | Status: SHIPPED | OUTPATIENT
Start: 2023-07-19 | End: 2023-07-26

## 2023-07-19 NOTE — TELEPHONE ENCOUNTER
9:10 AM    Reason for Call: Phone Call    Description: Patient was in clinic to see another provider and needs a refill for oxycodone as his insurance will only cover a week at a time.  Please send refill to [facility]. Please call patient back.     Was an appointment offered for this call? No  If yes : Appointment type              Date    Preferred method for responding to this message: Telephone Call  What is your phone number ? [phone]    If we cannot reach you directly, may we leave a detailed response at the number you provided? Yes    Can this message wait until your PCP/provider returns, if available today? Not applicable, provider in clinic today    Ariana Giraldo

## 2023-07-19 NOTE — PROGRESS NOTES
"PHYSICAL THERAPY EVALUATION  Type of Visit: Evaluation    See electronic medical record for Abuse and Falls Screening details.    Subjective       Presenting condition or subjective complaint: lower back/hip pain  Date of onset: 07/06/23    Relevant medical history:     Dates & types of surgery:      Prior diagnostic imaging/testing results: X-ray     Prior therapy history for the same diagnosis, illness or injury: No      Prior Level of Function  Transfers: Independent  Ambulation: Independent  ADL: Independent  IADL:     Living Environment  Social support: Alone   Type of home: House   Stairs to enter the home: Yes       Ramp: No   Stairs inside the home: Yes 21 Is there a railing: Yes   Help at home: None  Equipment owned: Straight Cane; Walker with wheels     Employment: Yes teacher/business owner  Hobbies/Interests: sports    Patient goals for therapy: get back to normal life conditions    Pain assessment: Patient reports onset of back and leg pain on 7/06/23.  He noted the pain as first as being mild and progressively worsened to the point where he could not even stand.  He did go to the emergency room in [place] having to use a walker.  He states x-rays were taken and was told he had \"back spasms \".  He was seen by his primary care provider on 7/12/2023 and is now referred to physical therapy.  He denies any injuries.  He does note he was helping someone move some furniture however did not have any pain at that time.  He notes initially his pain was bilateral lower back and into the bilateral lower extremities.  However he notes the right-sided low back and lower extremity symptoms have resolved.     Objective   LUMBAR SPINE EVALUATION  PAIN: Pain Level at Rest: 2/10  Pain Level with Use: 10/10  Pain Location: Left low back/gluteal into the posterior thigh and calf  Pain Quality: Aching, Miserable, Numb, Sharp, Shooting, Throbbing, Tingling and Unbearable  Pain Frequency: constant  Pain is Exacerbated By: " ADLs, lifting, walking, sitting, standing, sleeping, household and work tasks, bed mobility  Pain is Relieved By: cold, heat and rest  Pain Progression: Improved  INTEGUMENTARY (edema, incisions):   POSTURE: Decreased lumbar lordosis.  The left iliac crest and shoulder were elevated as compared to the right  GAIT:   Weightbearing Status:   Assistive Device(s): Cane (single end), Walker (standard)  Gait Deviations: Antalgic  BALANCE/PROPRIOCEPTION:   WEIGHTBEARING ALIGNMENT: He has difficulty fully weightbearing on the left lower extremity due to pain  NON-WEIGHTBEARING ALIGNMENT:    ROM: Trunk flexion = 0 to 68 degrees with increased left low back and lower extremity pain, back bending = 0 to 30 degrees, right sidebending = 0 to 25 degrees with contralateral pull, left sidebending = 0 to 34 degrees with slight left low back pain  PELVIC/SI SCREEN: Positive for a right and right anterior sacral torsion dysfunction  STRENGTH: Not assessed today    MYOTOMES: Patient had difficulty toe walking on left  DTR S:   CORD SIGNS: WNL  DERMATOMES: WNL  NEURAL TENSION: Positive dural tension when biasing the sciatic nerve on the left side  FLEXIBILITY: Hypomobility noted in the left hamstring, quadricep, piriformis and gastroc muscles  LUMBAR/HIP Special Tests: Positive slump test on left.  Negative hip special tests-FADIR's, FABERs, hip scour   PELVIS/SI SPECIAL TESTS: Positive right on right anterior sacral torsion dysfunction  FUNCTIONAL TESTS:   PALPATION: Point tenderness over the left SI joint/PSIS.  Soft tissue tension and tenderness to palpation especially along the left gluteal/piriformis muscles slight soft tissue tension tenderness along the lumbar paraspinals and quadratus lumborum muscles  SPINAL SEGMENTAL CONCLUSIONS: No dysfunctions noted however assessment was difficult due to patient's acute pain and difficulty getting into and out of testing positions      Assessment & Plan   CLINICAL IMPRESSIONS  Medical  Diagnosis: Acute left-sided low back pain with left-sided sciatica    Treatment Diagnosis: Acute left-sided low back pain with left sided sciatica   Impression/Assessment: Patient is a 37 year old male with low back and lower extremity complaints.  The following significant findings have been identified: Pain, Decreased ROM/flexibility, Decreased strength, Impaired gait, Impaired muscle performance, Decreased activity tolerance and Impaired posture. These impairments interfere with their ability to perform self care tasks, work tasks, recreational activities, household chores, driving , household mobility and community mobility as compared to previous level of function.     Clinical Decision Making (Complexity):  Clinical Presentation: Evolving/Changing  Clinical Presentation Rationale: based on medical and personal factors listed in PT evaluation  Clinical Decision Making (Complexity): Moderate complexity    PLAN OF CARE  Treatment Interventions:  Modalities: Cryotherapy, E-stim, Ultrasound  Interventions: Manual Therapy, Neuromuscular Re-education, Therapeutic Activity, Therapeutic Exercise    Long Term Goals     PT Goal 1  Goal Identifier: STG 1  Goal Description: Decreased pain when at its worst to a 8/10  Target Date: 08/02/23  PT Goal 2  Goal Identifier: LTG 1  Goal Description: Patient will demonstrate independence with home exercise program  Target Date: 09/13/23  PT Goal 3  Goal Identifier: LTG 2  Goal Description: Patient be able to walk community distances without an assistive device  Rationale: to maximize safety and independence within the community  Target Date: 09/13/23      Frequency of Treatment: 2 times/week  Duration of Treatment: Up to 8 weeks    Recommended Referrals to Other Professionals: Physical Therapy  Education Assessment:   Learner/Method: Patient;Listening;Reading;Demonstration;Pictures/Video;No Barriers to Learning    Risks and benefits of evaluation/treatment have been explained.    Patient/Family/caregiver agrees with Plan of Care.     Evaluation Time:     PT Tomasz, Moderate Complexity Minutes (62233): 35       Signing Clinician: Meagan Ross PT

## 2023-07-21 ENCOUNTER — THERAPY VISIT (OUTPATIENT)
Dept: PHYSICAL THERAPY | Facility: OTHER | Age: 38
End: 2023-07-21
Attending: NURSE PRACTITIONER
Payer: COMMERCIAL

## 2023-07-21 DIAGNOSIS — M54.42 ACUTE LEFT-SIDED LOW BACK PAIN WITH LEFT-SIDED SCIATICA: Primary | ICD-10-CM

## 2023-07-21 PROCEDURE — 97140 MANUAL THERAPY 1/> REGIONS: CPT | Mod: GP

## 2023-07-21 PROCEDURE — 97035 APP MDLTY 1+ULTRASOUND EA 15: CPT | Mod: GP

## 2023-07-26 ENCOUNTER — THERAPY VISIT (OUTPATIENT)
Dept: PHYSICAL THERAPY | Facility: OTHER | Age: 38
End: 2023-07-26
Attending: NURSE PRACTITIONER
Payer: COMMERCIAL

## 2023-07-26 DIAGNOSIS — M54.42 ACUTE LEFT-SIDED LOW BACK PAIN WITH LEFT-SIDED SCIATICA: Primary | ICD-10-CM

## 2023-07-26 PROCEDURE — 97035 APP MDLTY 1+ULTRASOUND EA 15: CPT | Mod: GP

## 2023-07-26 PROCEDURE — 97140 MANUAL THERAPY 1/> REGIONS: CPT | Mod: GP

## 2023-07-28 ENCOUNTER — THERAPY VISIT (OUTPATIENT)
Dept: PHYSICAL THERAPY | Facility: OTHER | Age: 38
End: 2023-07-28
Attending: NURSE PRACTITIONER
Payer: COMMERCIAL

## 2023-07-28 DIAGNOSIS — M54.42 ACUTE LEFT-SIDED LOW BACK PAIN WITH LEFT-SIDED SCIATICA: Primary | ICD-10-CM

## 2023-07-28 PROCEDURE — 97035 APP MDLTY 1+ULTRASOUND EA 15: CPT | Mod: GP

## 2023-07-28 PROCEDURE — 97140 MANUAL THERAPY 1/> REGIONS: CPT | Mod: GP

## 2023-08-02 ENCOUNTER — THERAPY VISIT (OUTPATIENT)
Dept: PHYSICAL THERAPY | Facility: OTHER | Age: 38
End: 2023-08-02
Attending: NURSE PRACTITIONER
Payer: COMMERCIAL

## 2023-08-02 DIAGNOSIS — M54.42 ACUTE LEFT-SIDED LOW BACK PAIN WITH LEFT-SIDED SCIATICA: Primary | ICD-10-CM

## 2023-08-02 PROCEDURE — 97110 THERAPEUTIC EXERCISES: CPT | Mod: GP

## 2023-08-02 PROCEDURE — 97140 MANUAL THERAPY 1/> REGIONS: CPT | Mod: GP

## 2023-08-02 PROCEDURE — 97035 APP MDLTY 1+ULTRASOUND EA 15: CPT | Mod: GP

## 2023-08-04 ENCOUNTER — THERAPY VISIT (OUTPATIENT)
Dept: PHYSICAL THERAPY | Facility: OTHER | Age: 38
End: 2023-08-04
Attending: NURSE PRACTITIONER
Payer: COMMERCIAL

## 2023-08-04 DIAGNOSIS — M54.42 ACUTE LEFT-SIDED LOW BACK PAIN WITH LEFT-SIDED SCIATICA: Primary | ICD-10-CM

## 2023-08-04 PROCEDURE — 97035 APP MDLTY 1+ULTRASOUND EA 15: CPT | Mod: GP

## 2023-08-04 PROCEDURE — 97140 MANUAL THERAPY 1/> REGIONS: CPT | Mod: GP

## 2023-08-09 ENCOUNTER — THERAPY VISIT (OUTPATIENT)
Dept: PHYSICAL THERAPY | Facility: OTHER | Age: 38
End: 2023-08-09
Attending: NURSE PRACTITIONER
Payer: COMMERCIAL

## 2023-08-09 DIAGNOSIS — M54.42 ACUTE LEFT-SIDED LOW BACK PAIN WITH LEFT-SIDED SCIATICA: Primary | ICD-10-CM

## 2023-08-09 PROCEDURE — 97110 THERAPEUTIC EXERCISES: CPT | Mod: GP

## 2023-08-09 PROCEDURE — 97140 MANUAL THERAPY 1/> REGIONS: CPT | Mod: GP

## 2023-10-03 PROBLEM — M54.42 ACUTE LEFT-SIDED LOW BACK PAIN WITH LEFT-SIDED SCIATICA: Status: RESOLVED | Noted: 2023-07-19 | Resolved: 2023-10-03

## 2023-10-03 NOTE — PROGRESS NOTES
DISCHARGE  Reason for Discharge: Patient has met all goals.    Equipment Issued: none    Discharge Plan: Patient to continue home program.    Referring Provider: Papo Mckinney NP   08/09/23 0806   Appointment Info   Signing clinician's name / credentials Meagan Ross, PT   Visits Used 7   Medical Diagnosis Acute left-sided low back pain with left-sided sciatica   PT Tx Diagnosis Acute left-sided low back pain with left sided sciatica   Precautions/Limitations None   Progress Note/Certification   Onset of illness/injury or Date of Surgery 07/06/23   Therapy Frequency 2 times/week   Predicted Duration Up to 8 weeks   PT Goal 1   Goal Identifier STG 1   Goal Description Decreased pain when at its worst to a 8/10   Target Date 08/02/23   Date Met 08/02/23   PT Goal 2   Goal Identifier LTG 1   Goal Description Patient will demonstrate independence with home exercise program   Target Date 09/13/23   Date Met 08/09/23   PT Goal 3   Goal Identifier LTG 2   Goal Description Patient be able to walk community distances without an assistive device   Target Date 09/13/23   Date Met 08/09/23   Subjective Report   Subjective Report pt reports he is doing much better than the last week and thinks he had one of his best mornings yesterday. He is doing all the exs and is having no pain today. He did note some mild soreness after golfing yesterday   Objective Measure 1   Objective Measure SIJ   Details no dysfx found  in SIJ or lumbar spine today   Therapeutic Procedure/Exercise   Therapeutic Procedures: strength, endurance, ROM, flexibillity minutes (83746) 8   Ther Proc 1 HEP. Pt. ed   Ther Proc 1 - Details reviewedabs- heel slides  and discussed how to progress with supine ab exs. Added today: supine bridge x4 w 5 sec hold w cues to hold ab and glute set   Manual Therapy   Manual Therapy: Mobilization, MFR, MLD, friction massage minutes (43364) 30   Manual Therapy 2 STM   Manual Therapy 2 - Details STM L LB/  gluteal/pirifomis mms . Re- assessed spinal and SIJ mechanics with no dysfxs found   Skilled Intervention Manual therapy-improve soft tissue and/or spinal/joint restrictions   Patient Response/Progress tolerated well , less tightness   Plan   Home program bridge   Plan for next session one more session   Comments   Comments prone or 4 pt exs   Total Session Time   Timed Code Treatment Minutes 38   Total Treatment Time (sum of timed and untimed services) 38

## 2023-11-15 ENCOUNTER — TRANSFERRED RECORDS (OUTPATIENT)
Dept: HEALTH INFORMATION MANAGEMENT | Facility: CLINIC | Age: 38
End: 2023-11-15

## 2023-11-16 ENCOUNTER — LAB (OUTPATIENT)
Dept: LAB | Facility: OTHER | Age: 38
End: 2023-11-16
Payer: COMMERCIAL

## 2023-11-16 ENCOUNTER — TELEPHONE (OUTPATIENT)
Dept: FAMILY MEDICINE | Facility: OTHER | Age: 38
End: 2023-11-16

## 2023-11-16 DIAGNOSIS — H20.9 IRITIS OF RIGHT EYE: ICD-10-CM

## 2023-11-16 DIAGNOSIS — H20.9 IRITIS OF RIGHT EYE: Primary | ICD-10-CM

## 2023-11-16 LAB
BASOPHILS # BLD AUTO: 0 10E3/UL (ref 0–0.2)
BASOPHILS NFR BLD AUTO: 1 %
EOSINOPHIL # BLD AUTO: 0 10E3/UL (ref 0–0.7)
EOSINOPHIL NFR BLD AUTO: 0 %
ERYTHROCYTE [DISTWIDTH] IN BLOOD BY AUTOMATED COUNT: 12.9 % (ref 10–15)
HCT VFR BLD AUTO: 42.4 % (ref 40–53)
HGB BLD-MCNC: 14.7 G/DL (ref 13.3–17.7)
IMM GRANULOCYTES # BLD: 0 10E3/UL
IMM GRANULOCYTES NFR BLD: 0 %
LYMPHOCYTES # BLD AUTO: 1.4 10E3/UL (ref 0.8–5.3)
LYMPHOCYTES NFR BLD AUTO: 18 %
MCH RBC QN AUTO: 31.1 PG (ref 26.5–33)
MCHC RBC AUTO-ENTMCNC: 34.7 G/DL (ref 31.5–36.5)
MCV RBC AUTO: 90 FL (ref 78–100)
MONOCYTES # BLD AUTO: 0.6 10E3/UL (ref 0–1.3)
MONOCYTES NFR BLD AUTO: 8 %
NEUTROPHILS # BLD AUTO: 5.5 10E3/UL (ref 1.6–8.3)
NEUTROPHILS NFR BLD AUTO: 73 %
PLATELET # BLD AUTO: 265 10E3/UL (ref 150–450)
RBC # BLD AUTO: 4.72 10E6/UL (ref 4.4–5.9)
WBC # BLD AUTO: 7.5 10E3/UL (ref 4–11)

## 2023-11-16 PROCEDURE — 82164 ANGIOTENSIN I ENZYME TEST: CPT | Mod: 90

## 2023-11-16 PROCEDURE — 86618 LYME DISEASE ANTIBODY: CPT

## 2023-11-16 PROCEDURE — 36415 COLL VENOUS BLD VENIPUNCTURE: CPT

## 2023-11-16 PROCEDURE — 85549 MURAMIDASE: CPT | Mod: 90

## 2023-11-16 PROCEDURE — 81374 HLA I TYPING 1 ANTIGEN LR: CPT

## 2023-11-16 PROCEDURE — 85025 COMPLETE CBC W/AUTO DIFF WBC: CPT

## 2023-11-16 PROCEDURE — 86431 RHEUMATOID FACTOR QUANT: CPT

## 2023-11-16 NOTE — TELEPHONE ENCOUNTER
11:03 AM    Reason for Call: Phone Call    Description: Patricia calling from [facility] wanting to speak to Papo Sweeney regarding this patient that Dr Keita wants Papo Sweeney to order lab work.  Dr Keita saw Dorian for iritis that is worsening and they are faxing Dr Keita note.     Preferred method for responding to this message: Telephone Call  What is your phone number ?    If we cannot reach you directly, may we leave a detailed response at the number you provided? Yes    Can this message wait until your PCP/provider returns, if available today? YES, No

## 2023-11-16 NOTE — TELEPHONE ENCOUNTER
Spoke with Patricia and she states the tayler had been seeing patient for Right iritis and not responding to her treatments she has recently changed drops and requesting labwork be done for patient . HLA-B27,  CBC  RF   SERUM LYSIZIME  LYMES TITER,   ACE

## 2023-11-16 NOTE — TELEPHONE ENCOUNTER
Dr Lee' office notified that patient was notified and coming in at 3:15 today for the labs and a note put on the labs to have results also faxed to Dr. Lee office

## 2023-11-17 LAB — RHEUMATOID FACT SERPL-ACNC: <10 IU/ML

## 2023-11-18 LAB — B BURGDOR IGG+IGM SER QL: 0.04

## 2023-11-22 LAB
ACE SERPL-CCNC: <10 U/L
B LOCUS: NORMAL
B27TEST METHOD: NORMAL
LYSOZYME SERPL-MCNC: 1.54 UG/ML

## 2023-11-29 ENCOUNTER — MYC REFILL (OUTPATIENT)
Dept: FAMILY MEDICINE | Facility: OTHER | Age: 38
End: 2023-11-29

## 2023-11-29 DIAGNOSIS — N52.9 ERECTILE DYSFUNCTION, UNSPECIFIED ERECTILE DYSFUNCTION TYPE: ICD-10-CM

## 2023-11-30 ENCOUNTER — TELEPHONE (OUTPATIENT)
Dept: FAMILY MEDICINE | Facility: OTHER | Age: 38
End: 2023-11-30

## 2023-11-30 NOTE — TELEPHONE ENCOUNTER
9:09 AM    Reason for Call: Phone Call    Description: Patricia calling from [facility] needing to speak to Papo Sweeney nurse regarding this patient. Regarding a lab HLA-B27 result they are looking for this has been faxed to them and the results isn't showing up on the fax they are receiving from us.   She wants a call back before they fax this lab result.      Preferred method for responding to this message: Telephone Call  What is your phone number ? [phone]    If we cannot reach you directly, may we leave a detailed response at the number you provided? Yes    Can this message wait until your PCP/provider returns, if available today? YES, No

## 2023-11-30 NOTE — TELEPHONE ENCOUNTER
Talked to Patricia at [facility] notified positive results and also faxed off results

## 2023-11-30 NOTE — TELEPHONE ENCOUNTER
Sildenafil (Viagra) 100 MG tablet    Last Written Prescription Date:  02/20/2023  Last Fill Quantity: 12,   # refills: 5  Last Office Visit: 07/12/2023

## 2023-12-01 RX ORDER — SILDENAFIL 100 MG/1
TABLET, FILM COATED ORAL
Qty: 12 TABLET | Refills: 3 | Status: SHIPPED | OUTPATIENT
Start: 2023-12-01

## 2023-12-10 ENCOUNTER — HEALTH MAINTENANCE LETTER (OUTPATIENT)
Age: 38
End: 2023-12-10

## 2024-01-05 ENCOUNTER — TRANSFERRED RECORDS (OUTPATIENT)
Dept: HEALTH INFORMATION MANAGEMENT | Facility: CLINIC | Age: 39
End: 2024-01-05

## 2024-03-19 ENCOUNTER — TELEPHONE (OUTPATIENT)
Dept: FAMILY MEDICINE | Facility: OTHER | Age: 39
End: 2024-03-19

## 2024-03-19 NOTE — TELEPHONE ENCOUNTER
8:52 AM    Reason for Call: Phone Call    Description: Patient called in asking if Papo could prescribe an antibiotic for the seasonal sinus infection that he gets. Patient is available on 3-21-24 for an appointment. Please call patient back.     Was an appointment offered for this call? No  If yes : Appointment type              Date    Preferred method for responding to this message: Telephone Call  What is your phone number ? [phone]     If we cannot reach you directly, may we leave a detailed response at the number you provided? Yes    Can this message wait until your PCP/provider returns, if available today? Not applicable, PROVIDER IN CLINIC    Ariana Giraldo

## 2024-03-20 NOTE — PROGRESS NOTES
"  Assessment & Plan     1. Acute sinusitis with symptoms greater than 10 days  Consider flonase or nasacort  Continue zyrtec  Symptomatic cares reviewed   - amoxicillin-clavulanate (AUGMENTIN) 875-125 MG tablet; Take 1 tablet by mouth 2 times daily for 14 days  Dispense: 28 tablet; Refill: 0    2. HLA B27 (HLA B27 positive)  Ophthalmology notes reviewed - iritis which has resolved.   Denies any current symptoms.       BMI  Estimated body mass index is 24.39 kg/m  as calculated from the following:    Height as of 7/12/23: 1.778 m (5' 10\").    Weight as of this encounter: 77.1 kg (170 lb).     Follow-up if no improvement or any worsening.     The longitudinal plan of care for the diagnosis(es)/condition(s) as documented were addressed during this visit. Due to the added complexity in care, I will continue to support Dorian in the subsequent management and with ongoing continuity of care.    Ana Mckinney,   Certified Adult Nurse Practitioner  [phone]      Subjective   Dorian is a 38 year old, presenting for the following health issues:  Sinus Problem    HPI     Acute Illness  Acute illness concerns: Sinus issue  Onset/Duration: 2 weeks  Symptoms:  Fever: No  Chills/Sweats: YES  Headache (location?): YES  Sinus Pressure: YES  Conjunctivitis:  No  Ear Pain: no  Rhinorrhea: YES  Congestion: YES  Sore Throat: YES  Cough: YES-non-productive, productive of yellow sputum, productive of green sputum, with shortness of breath  Wheeze: YES  Decreased Appetite: No  Nausea: No  Vomiting: No  Diarrhea: No  Dysuria/Freq.: No  Dysuria or Hematuria: No  Fatigue/Achiness: YES  Sick/Strep Exposure: No  Therapies tried and outcome: Mucinix cough drops, Advil     History of iritis, was following with Dr Keita - all symptoms have resolved.  He is HLA-B27 positive.  Denies any symptoms at this time.        Recent Labs   Lab Test 07/01/21  1521   *   HDL 67   TRIG 52   CR 0.96   GFRESTIMATED >90   GFRESTBLACK >90 "   POTASSIUM 3.8   TSH 3.30      BP Readings from Last 3 Encounters:   03/21/24 99/69   07/12/23 110/58   03/17/23 120/68    Wt Readings from Last 3 Encounters:   03/21/24 77.1 kg (170 lb)   07/12/23 84.8 kg (187 lb)   03/17/23 88.5 kg (195 lb 1.6 oz)                 Review of Systems  Constitutional, neuro, ENT, endocrine, pulmonary, cardiac, gastrointestinal, genitourinary, musculoskeletal, integument and psychiatric systems are negative, except as otherwise noted.      Objective    BP 99/69 (BP Location: Right arm, Patient Position: Sitting, Cuff Size: Adult Large)   Pulse 70   Temp 97.3  F (36.3  C) (Tympanic)   Resp 16   Wt 77.1 kg (170 lb)   SpO2 97%   BMI 24.39 kg/m    Body mass index is 24.39 kg/m .  Physical Exam   GENERAL: alert and no distress  HENT: normal cephalic/atraumatic, ear canals and TM's normal, nasal mucosa edematous , and rhinorrhea yellow, green, and thick - similar post nasal drainage with cobblestoning noted. No exudate.    NECK: no adenopathy, no asymmetry, masses, or scars  RESP: lungs clear to auscultation - no rales, rhonchi or wheezes  CV: regular rate and rhythm, normal S1 S2, no S3 or S4, no murmur, click or rub, no peripheral edema  MS: no gross musculoskeletal defects noted, no edema  PSYCH: mentation appears normal, affect normal/bright            Signed Electronically by: Ana Mckinney NP

## 2024-03-21 ENCOUNTER — OFFICE VISIT (OUTPATIENT)
Dept: FAMILY MEDICINE | Facility: OTHER | Age: 39
End: 2024-03-21
Attending: NURSE PRACTITIONER
Payer: COMMERCIAL

## 2024-03-21 VITALS
RESPIRATION RATE: 16 BRPM | HEART RATE: 70 BPM | DIASTOLIC BLOOD PRESSURE: 69 MMHG | SYSTOLIC BLOOD PRESSURE: 99 MMHG | OXYGEN SATURATION: 97 % | TEMPERATURE: 97.3 F | WEIGHT: 170 LBS | BODY MASS INDEX: 24.39 KG/M2

## 2024-03-21 DIAGNOSIS — J01.90 ACUTE SINUSITIS WITH SYMPTOMS GREATER THAN 10 DAYS: Primary | ICD-10-CM

## 2024-03-21 DIAGNOSIS — Z15.89 HLA B27 (HLA B27 POSITIVE): ICD-10-CM

## 2024-03-21 PROCEDURE — G2211 COMPLEX E/M VISIT ADD ON: HCPCS | Performed by: NURSE PRACTITIONER

## 2024-03-21 PROCEDURE — 99213 OFFICE O/P EST LOW 20 MIN: CPT | Performed by: NURSE PRACTITIONER

## 2024-03-21 ASSESSMENT — PAIN SCALES - GENERAL: PAINLEVEL: NO PAIN (0)

## 2024-10-09 ENCOUNTER — OFFICE VISIT (OUTPATIENT)
Dept: FAMILY MEDICINE | Facility: OTHER | Age: 39
End: 2024-10-09
Attending: NURSE PRACTITIONER
Payer: COMMERCIAL

## 2024-10-09 VITALS
TEMPERATURE: 97.1 F | SYSTOLIC BLOOD PRESSURE: 104 MMHG | WEIGHT: 188.4 LBS | RESPIRATION RATE: 16 BRPM | DIASTOLIC BLOOD PRESSURE: 69 MMHG | HEIGHT: 70 IN | OXYGEN SATURATION: 99 % | BODY MASS INDEX: 26.97 KG/M2 | HEART RATE: 51 BPM

## 2024-10-09 DIAGNOSIS — Z15.89 HLA B27 (HLA B27 POSITIVE): ICD-10-CM

## 2024-10-09 DIAGNOSIS — F41.9 ANXIETY: ICD-10-CM

## 2024-10-09 DIAGNOSIS — Z00.00 ANNUAL PHYSICAL EXAM: Primary | ICD-10-CM

## 2024-10-09 DIAGNOSIS — F41.0 PANIC DISORDER WITHOUT AGORAPHOBIA: ICD-10-CM

## 2024-10-09 DIAGNOSIS — K21.9 GASTROESOPHAGEAL REFLUX DISEASE WITHOUT ESOPHAGITIS: ICD-10-CM

## 2024-10-09 PROCEDURE — 99213 OFFICE O/P EST LOW 20 MIN: CPT | Mod: 25 | Performed by: NURSE PRACTITIONER

## 2024-10-09 PROCEDURE — 99395 PREV VISIT EST AGE 18-39: CPT | Performed by: NURSE PRACTITIONER

## 2024-10-09 RX ORDER — LORAZEPAM 0.5 MG/1
0.5 TABLET ORAL DAILY PRN
Qty: 20 TABLET | Refills: 0 | Status: SHIPPED | OUTPATIENT
Start: 2024-10-09

## 2024-10-09 RX ORDER — OMEPRAZOLE 20 MG/1
20 TABLET, DELAYED RELEASE ORAL DAILY
Qty: 90 TABLET | Refills: 1 | Status: SHIPPED | OUTPATIENT
Start: 2024-10-09

## 2024-10-09 SDOH — HEALTH STABILITY: PHYSICAL HEALTH: ON AVERAGE, HOW MANY DAYS PER WEEK DO YOU ENGAGE IN MODERATE TO STRENUOUS EXERCISE (LIKE A BRISK WALK)?: 5 DAYS

## 2024-10-09 SDOH — HEALTH STABILITY: PHYSICAL HEALTH: ON AVERAGE, HOW MANY MINUTES DO YOU ENGAGE IN EXERCISE AT THIS LEVEL?: 30 MIN

## 2024-10-09 ASSESSMENT — SOCIAL DETERMINANTS OF HEALTH (SDOH): HOW OFTEN DO YOU GET TOGETHER WITH FRIENDS OR RELATIVES?: MORE THAN THREE TIMES A WEEK

## 2024-10-09 ASSESSMENT — PAIN SCALES - GENERAL: PAINLEVEL: MILD PAIN (2)

## 2024-10-09 NOTE — PROGRESS NOTES
"Preventive Care Visit  [facility]  Ana Mckinney, NP, Family Medicine  Oct 9, 2024      Assessment & Plan     1. Annual physical exam  Exam completed  Declined lab for now    2. Anxiety  - LORazepam (ATIVAN) 0.5 MG tablet; Take 1 tablet (0.5 mg) by mouth daily as needed for anxiety. Do not drive after taking this medication  Dispense: 20 tablet; Refill: 0    3. Panic disorder without agoraphobia  - LORazepam (ATIVAN) 0.5 MG tablet; Take 1 tablet (0.5 mg) by mouth daily as needed for anxiety. Do not drive after taking this medication  Dispense: 20 tablet; Refill: 0    4. HLA B27 (HLA B27 positive)  stable    5. Gastroesophageal reflux disease without esophagitis  - omeprazole (PRILOSEC OTC) 20 MG EC tablet; Take 1 tablet (20 mg) by mouth daily.  Dispense: 90 tablet; Refill: 1      Patient has been advised of split billing requirements and indicates understanding: Yes        BMI  Estimated body mass index is 27.03 kg/m  as calculated from the following:    Height as of this encounter: 1.778 m (5' 10\").    Weight as of this encounter: 85.5 kg (188 lb 6.4 oz).   Weight management plan: Discussed healthy diet and exercise guidelines    Counseling  Appropriate preventive services were addressed with this patient via screening, questionnaire, or discussion as appropriate for fall prevention, nutrition, physical activity, Tobacco-use cessation, social engagement, weight loss and cognition.  Checklist reviewing preventive services available has been given to the patient.  Reviewed patient's diet, addressing concerns and/or questions.   The patient was instructed to see the dentist every 6 months.   He is at risk for psychosocial distress and has been provided with information to reduce risk.       Follow-up annually or as needed     Ana Mckinney,   Certified Adult Nurse Practitioner  [phone]      Mercy Alarcon is a 38 year old, presenting for the following:  Physical        10/9/2024    11:30 AM "   Additional Questions   Roomed by aracelis   Accompanied by none        Health Care Directive  Patient does not have a Health Care Directive or Living Will: Discussed advance care planning with patient; however, patient declined at this time.    HPI      Anxiety   How are you doing with your anxiety since your last visit? Worsened   Are you having other symptoms that might be associated with anxiety? No  Have you had a significant life event? Grief or Loss   Are you feeling depressed? No  Do you have any concerns with your use of alcohol or other drugs? No    Social History     Tobacco Use    Smoking status: Former     Current packs/day: 0.00     Average packs/day: 0.5 packs/day for 5.0 years (2.5 ttl pk-yrs)     Types: Cigarettes     Start date: 2009     Quit date: 2014     Years since quittin.8    Smokeless tobacco: Never    Tobacco comments:     quit dec 1 2014   Vaping Use    Vaping status: Never Used   Substance Use Topics    Alcohol use: Yes     Comment: 2 beers, rarely     Drug use: No         2017     2:33 PM 2018    10:00 AM 2018     2:56 PM   АЛЕКСАНДР-7 SCORE   Total Score 5 7 2         2017     2:33 PM 2018    10:00 AM 2018     2:56 PM   PHQ   PHQ-9 Total Score 1 4 2   Q9: Thoughts of better off dead/self-harm past 2 weeks Not at all Not at all Not at all         2018     2:56 PM   Last PHQ-9   1.  Little interest or pleasure in doing things 0   2.  Feeling down, depressed, or hopeless 1   3.  Trouble falling or staying asleep, or sleeping too much 1   4.  Feeling tired or having little energy 0   5.  Poor appetite or overeating 0   6.  Feeling bad about yourself 0   7.  Trouble concentrating 0   8.  Moving slowly or restless 0   Q9: Thoughts of better off dead/self-harm past 2 weeks 0   PHQ-9 Total Score 2   Difficulty at work, home, or with people Not difficult at all         2018     2:56 PM   АЛЕКСАНДР-7    1. Feeling nervous, anxious, or on edge 1   2. Not  being able to stop or control worrying 0   3. Worrying too much about different things 1   4. Trouble relaxing 0   5. Being so restless that it is hard to sit still 0   6. Becoming easily annoyed or irritable 0   7. Feeling afraid, as if something awful might happen 0   АЛЕКСАНДР-7 Total Score 2   If you checked any problems, how difficult have they made it for you to do your work, take care of things at home, or get along with other people? Not difficult at all         10/9/2024   General Health   How would you rate your overall physical health? Good   Feel stress (tense, anxious, or unable to sleep) Only a little      (!) STRESS CONCERN      10/9/2024   Nutrition   Three or more servings of calcium each day? (!) NO   Diet: Regular (no restrictions)   How many servings of fruit and vegetables per day? (!) 0-1   How many sweetened beverages each day? 0-1            10/9/2024   Exercise   Days per week of moderate/strenous exercise 5 days   Average minutes spent exercising at this level 30 min            10/9/2024   Social Factors   Frequency of gathering with friends or relatives More than three times a week   Worry food won't last until get money to buy more No   Food not last or not have enough money for food? No   Do you have housing? (Housing is defined as stable permanent housing and does not include staying ouside in a car, in a tent, in an abandoned building, in an overnight shelter, or couch-surfing.) No   Are you worried about losing your housing? No   Lack of transportation? No   Unable to get utilities (heat,electricity)? No   Want help with housing or utility concern? No      (!) HOUSING CONCERN PRESENT      10/9/2024   Dental   Dentist two times every year? (!) NO            10/9/2024   TB Screening   Were you born outside of the [place]? No          Today's PHQ-2 Score:       3/21/2024     8:30 AM   PHQ-2 ( 1999 [employer])   Q1: Little interest or pleasure in doing things 0   Q2: Feeling down, depressed or hopeless  0   PHQ-2 Score 0   Q1: Little interest or pleasure in doing things Not at all   Q2: Feeling down, depressed or hopeless Not at all   PHQ-2 Score 0         10/9/2024   Substance Use   Alcohol more than 3/day or more than 7/wk No   Do you use any other substances recreationally? No        Social History     Tobacco Use    Smoking status: Former     Current packs/day: 0.00     Average packs/day: 0.5 packs/day for 5.0 years (2.5 ttl pk-yrs)     Types: Cigarettes     Start date: 2009     Quit date: 2014     Years since quittin.8    Smokeless tobacco: Never    Tobacco comments:     quit dec 1 2014   Vaping Use    Vaping status: Never Used   Substance Use Topics    Alcohol use: Yes     Comment: 2 beers, rarely     Drug use: No           10/9/2024   STI Screening   New sexual partner(s) since last STI/HIV test? No            10/9/2024   Contraception/Family Planning   Questions about contraception or family planning No    Reviewed and updated as needed this visit by Provider                    BP Readings from Last 3 Encounters:   10/09/24 104/69   24 99/69   23 110/58    Wt Readings from Last 3 Encounters:   10/09/24 85.5 kg (188 lb 6.4 oz)   24 77.1 kg (170 lb)   23 84.8 kg (187 lb)                  Recent Labs   Lab Test 21  1521   *   HDL 67   TRIG 52   CR 0.96   GFRESTIMATED >90   GFRESTBLACK >90   POTASSIUM 3.8   TSH 3.30          Review of Systems  CONSTITUTIONAL: NEGATIVE for fever, chills, change in weight  INTEGUMENTARY/SKIN: NEGATIVE for worrisome rashes, moles or lesions  EYES: NEGATIVE for vision changes or irritation  ENT/MOUTH: NEGATIVE for ear, mouth and throat problems  RESP: NEGATIVE for significant cough or SOB  BREAST: NEGATIVE for masses, tenderness or discharge  CV: NEGATIVE for chest pain, palpitations or peripheral edema  GI: NEGATIVE for nausea, abdominal pain, heartburn, or change in bowel habits  : NEGATIVE for frequency, dysuria, or  "hematuria  MUSCULOSKELETAL: NEGATIVE for significant arthralgias or myalgia  NEURO: NEGATIVE for weakness, dizziness or paresthesias  ENDOCRINE: NEGATIVE for temperature intolerance, skin/hair changes  HEME: NEGATIVE for bleeding problems  PSYCHIATRIC: NEGATIVE for changes in mood or affect     Objective    Exam  /69 (BP Location: Left arm, Patient Position: Chair, Cuff Size: Adult Large)   Pulse 51   Temp 97.1  F (36.2  C) (Tympanic)   Resp 16   Ht 1.778 m (5' 10\")   Wt 85.5 kg (188 lb 6.4 oz)   SpO2 99%   BMI 27.03 kg/m     Estimated body mass index is 27.03 kg/m  as calculated from the following:    Height as of this encounter: 1.778 m (5' 10\").    Weight as of this encounter: 85.5 kg (188 lb 6.4 oz).    Physical Exam  GENERAL: alert and no distress  NECK: no adenopathy, no asymmetry, masses, or scars, thyroid normal to palpation, and no carotid bruits  RESP: lungs clear to auscultation - no rales, rhonchi or wheezes  CV: regular rate and rhythm, normal S1 S2, no S3 or S4, no murmur  MS: no gross musculoskeletal defects noted, no edema  PSYCH: mentation appears normal, affect normal/bright        Signed Electronically by: Ana Mckinney NP    "

## 2024-10-15 ENCOUNTER — TELEPHONE (OUTPATIENT)
Dept: FAMILY MEDICINE | Facility: OTHER | Age: 39
End: 2024-10-15

## 2024-10-15 NOTE — TELEPHONE ENCOUNTER
PT called in regarding the MARISEL note that was requested originally through his moms chart. He just needs a note supporting his MARISEL    MARISEL- starting 08/27/24- end of school year 5/30/2025    note stating that he is taking care of mom and her with her medical issues as well as taking care of brothers children as he has been listed as a missing person    [phone]  call when note is ready to be picked up

## 2024-10-15 NOTE — LETTER
[facility]  [address]  [facility] [address]  Phone: [phone]    October 16, 2024        Dorian Cabrera  [address] [place] [id number]          To whom it may concern:    RE: Dorian Cabrera      Patient was seen and treated at our clinic.    He has requested a leave of absence for the school year dated 8/27/2024-5/30/2025 due to caring for his mother and brothers children due to a death in the family.        Please contact me for questions or concerns.        Sincerely,           Ana Mckinney,   Certified Adult Nurse Practitioner  [phone]

## 2024-10-16 ENCOUNTER — TELEPHONE (OUTPATIENT)
Dept: FAMILY MEDICINE | Facility: OTHER | Age: 39
End: 2024-10-16

## 2024-10-16 NOTE — TELEPHONE ENCOUNTER
Call placed to patient to discuss MARISEL Note request.     Patient has FMLA- MARISEL forms already completed and approved through his employer.     Patient is in need of a note reflecting: MARISEL- starting 08/27/24- end of school year 5/30/2025 in order for patient to access his Medical Leave Bank to get paid for his MARISEL time off.

## 2024-12-05 NOTE — TELEPHONE ENCOUNTER
10:59 AM    Reason for Call: Phone Call    Description: Patient called in asking that the letter he spoke to Chloe on the phone about be printed off and left at registration as he is unable to print it from [employer]. Please call patient back to confirm letter is at .     Was an appointment offered for this call? No  If yes : Appointment type              Date    Preferred method for responding to this message: Telephone Call  What is your phone number ? [phone]     If we cannot reach you directly, may we leave a detailed response at the number you provided? Yes    Can this message wait until your PCP/provider returns, if available today? Not applicable, PROVIDER IN CLINIC    Ariana Giraldo  
Notified will be at    
cough

## 2024-12-09 NOTE — PROGRESS NOTES
Assessment & Plan     1. Anxiety (Primary)  Dose increase   - DULoxetine (CYMBALTA) 30 MG capsule; Take 1 capsule (30 mg) by mouth 2 times daily.  Dispense: 180 capsule; Refill: 1    2. Ankylosing spondylitis of lumbar region (H)  As above  Continue stretching, ice, heat, has methocarbamol and ibuprofen at home.    - DULoxetine (CYMBALTA) 30 MG capsule; Take 1 capsule (30 mg) by mouth 2 times daily.  Dispense: 180 capsule; Refill: 1    3. Gastroesophageal reflux disease without esophagitis  Continue omeprazole    4. Erectile dysfunction, unspecified erectile dysfunction type  - sildenafil (VIAGRA) 100 MG tablet; Take 1/2-1 tablet 30 minutes prior to intercourse  Dispense: 12 tablet; Refill: 3    5. Panic disorder without agoraphobia  - DULoxetine (CYMBALTA) 30 MG capsule; Take 1 capsule (30 mg) by mouth 2 times daily.  Dispense: 180 capsule; Refill: 1    6. HLA B27 (HLA B27 positive)  - DULoxetine (CYMBALTA) 30 MG capsule; Take 1 capsule (30 mg) by mouth 2 times daily.  Dispense: 180 capsule; Refill: 1    He will be without medical insurance until September 2025; he will follow-up at that time.      Reviewed [facility] and he can always contact us for any issues.      The longitudinal plan of care for the diagnosis(es)/condition(s) as documented were addressed during this visit. Due to the added complexity in care, I will continue to support Dorian in the subsequent management and with ongoing continuity of care.    Ana Mckinney,   Certified Adult Nurse Practitioner  [phone]      Subjective   Dorian is a 39 year old, presenting for the following health issues:  No chief complaint on file.        12/12/2024     8:28 AM   Additional Questions   Roomed by Stewart Velez lpn   Accompanied by self     History of Present Illness       Mental Health Follow-up:  Patient presents to follow-up on Anxiety.    Patient's anxiety since last visit has been:  Good  The patient is not having other symptoms  associated with anxiety.  Any significant life events: job concerns, financial concerns and grief or loss  Patient is not feeling anxious or having panic attacks.  Patient has no concerns about alcohol or drug use.    He eats 0-1 servings of fruits and vegetables daily.He consumes 0 sweetened beverage(s) daily.He exercises with enough effort to increase his heart rate 20 to 29 minutes per day.  He exercises with enough effort to increase his heart rate 5 days per week.   He is taking medications regularly.       Anxiety   How are you doing with your anxiety since your last visit? Improved   Are you having other symptoms that might be associated with anxiety? No  Have you had a significant life event? No   Are you feeling depressed? No  Do you have any concerns with your use of alcohol or other drugs? No    Social History     Tobacco Use    Smoking status: Former     Current packs/day: 0.00     Average packs/day: 0.5 packs/day for 5.0 years (2.5 ttl pk-yrs)     Types: Cigarettes     Start date: 12/1/2009     Quit date: 12/1/2014     Years since quitting: 10.0    Smokeless tobacco: Never    Tobacco comments:     quit dec 1 2014   Vaping Use    Vaping status: Never Used   Substance Use Topics    Alcohol use: Yes     Comment: 2 beers, rarely     Drug use: No         12/14/2018     2:56 PM 11/22/2024     8:55 AM 12/12/2024     8:26 AM   АЛЕКСАНДР-7 SCORE   Total Score  6 (mild anxiety) 1 (minimal anxiety)   Total Score 2 6  1        Patient-reported         1/4/2018    10:00 AM 12/14/2018     2:56 PM 12/12/2024     8:26 AM   PHQ   PHQ-9 Total Score 4 2 1    Q9: Thoughts of better off dead/self-harm past 2 weeks Not at all Not at all Not at all        Patient-reported         12/12/2024     8:26 AM   Last PHQ-9   1.  Little interest or pleasure in doing things 0    2.  Feeling down, depressed, or hopeless 0    3.  Trouble falling or staying asleep, or sleeping too much 0    4.  Feeling tired or having little energy 1    5.   Poor appetite or overeating 0    6.  Feeling bad about yourself 0    7.  Trouble concentrating 0    8.  Moving slowly or restless 0    Q9: Thoughts of better off dead/self-harm past 2 weeks 0    PHQ-9 Total Score 1        Patient-reported         12/12/2024     8:26 AM   АЛЕКСАНДР-7    1. Feeling nervous, anxious, or on edge 0    2. Not being able to stop or control worrying 0    3. Worrying too much about different things 0    4. Trouble relaxing 1    5. Being so restless that it is hard to sit still 0    6. Becoming easily annoyed or irritable 0    7. Feeling afraid, as if something awful might happen 0    АЛЕКСАНДР-7 Total Score 1    If you checked any problems, how difficult have they made it for you to do your work, take care of things at home, or get along with other people? Not difficult at all        Patient-reported             Review of Systems  Constitutional, neuro, ENT, endocrine, pulmonary, cardiac, gastrointestinal, genitourinary, musculoskeletal, integument and psychiatric systems are negative, except as otherwise noted.      Objective    /68 (BP Location: Left arm, Patient Position: Sitting, Cuff Size: Adult Regular)   Pulse 66   Temp 97.8  F (36.6  C) (Tympanic)   Resp 16   Wt 85.1 kg (187 lb 11.2 oz)   SpO2 96%   BMI 26.93 kg/m    Body mass index is 26.93 kg/m .  Physical Exam   GENERAL: alert and no distress  MS: no gross musculoskeletal defects noted, no edema  PSYCH: mentation appears normal, affect normal/bright        Signed Electronically by: Ana Mckinney NP

## 2024-12-12 ENCOUNTER — OFFICE VISIT (OUTPATIENT)
Dept: FAMILY MEDICINE | Facility: OTHER | Age: 39
End: 2024-12-12
Attending: NURSE PRACTITIONER
Payer: COMMERCIAL

## 2024-12-12 ENCOUNTER — TELEPHONE (OUTPATIENT)
Dept: FAMILY MEDICINE | Facility: OTHER | Age: 39
End: 2024-12-12

## 2024-12-12 VITALS
OXYGEN SATURATION: 96 % | HEART RATE: 66 BPM | RESPIRATION RATE: 16 BRPM | BODY MASS INDEX: 26.93 KG/M2 | WEIGHT: 187.7 LBS | SYSTOLIC BLOOD PRESSURE: 118 MMHG | DIASTOLIC BLOOD PRESSURE: 68 MMHG | TEMPERATURE: 97.8 F

## 2024-12-12 DIAGNOSIS — K21.9 GASTROESOPHAGEAL REFLUX DISEASE WITHOUT ESOPHAGITIS: ICD-10-CM

## 2024-12-12 DIAGNOSIS — M45.6 ANKYLOSING SPONDYLITIS OF LUMBAR REGION (H): ICD-10-CM

## 2024-12-12 DIAGNOSIS — Z15.89 HLA B27 (HLA B27 POSITIVE): ICD-10-CM

## 2024-12-12 DIAGNOSIS — F41.0 PANIC DISORDER WITHOUT AGORAPHOBIA: ICD-10-CM

## 2024-12-12 DIAGNOSIS — N52.9 ERECTILE DYSFUNCTION, UNSPECIFIED ERECTILE DYSFUNCTION TYPE: ICD-10-CM

## 2024-12-12 DIAGNOSIS — F41.9 ANXIETY: Primary | ICD-10-CM

## 2024-12-12 RX ORDER — DULOXETIN HYDROCHLORIDE 30 MG/1
30 CAPSULE, DELAYED RELEASE ORAL 2 TIMES DAILY
Qty: 180 CAPSULE | Refills: 1 | Status: SHIPPED | OUTPATIENT
Start: 2024-12-12

## 2024-12-12 RX ORDER — SILDENAFIL 100 MG/1
TABLET, FILM COATED ORAL
Qty: 12 TABLET | Refills: 3 | Status: SHIPPED | OUTPATIENT
Start: 2024-12-12

## 2024-12-12 ASSESSMENT — PATIENT HEALTH QUESTIONNAIRE - PHQ9
SUM OF ALL RESPONSES TO PHQ QUESTIONS 1-9: 1
SUM OF ALL RESPONSES TO PHQ QUESTIONS 1-9: 1
10. IF YOU CHECKED OFF ANY PROBLEMS, HOW DIFFICULT HAVE THESE PROBLEMS MADE IT FOR YOU TO DO YOUR WORK, TAKE CARE OF THINGS AT HOME, OR GET ALONG WITH OTHER PEOPLE: NOT DIFFICULT AT ALL

## 2024-12-12 ASSESSMENT — ANXIETY QUESTIONNAIRES
7. FEELING AFRAID AS IF SOMETHING AWFUL MIGHT HAPPEN: NOT AT ALL
GAD7 TOTAL SCORE: 1
4. TROUBLE RELAXING: SEVERAL DAYS
6. BECOMING EASILY ANNOYED OR IRRITABLE: NOT AT ALL
5. BEING SO RESTLESS THAT IT IS HARD TO SIT STILL: NOT AT ALL
3. WORRYING TOO MUCH ABOUT DIFFERENT THINGS: NOT AT ALL
7. FEELING AFRAID AS IF SOMETHING AWFUL MIGHT HAPPEN: NOT AT ALL
1. FEELING NERVOUS, ANXIOUS, OR ON EDGE: NOT AT ALL
2. NOT BEING ABLE TO STOP OR CONTROL WORRYING: NOT AT ALL
8. IF YOU CHECKED OFF ANY PROBLEMS, HOW DIFFICULT HAVE THESE MADE IT FOR YOU TO DO YOUR WORK, TAKE CARE OF THINGS AT HOME, OR GET ALONG WITH OTHER PEOPLE?: NOT DIFFICULT AT ALL
GAD7 TOTAL SCORE: 1
IF YOU CHECKED OFF ANY PROBLEMS ON THIS QUESTIONNAIRE, HOW DIFFICULT HAVE THESE PROBLEMS MADE IT FOR YOU TO DO YOUR WORK, TAKE CARE OF THINGS AT HOME, OR GET ALONG WITH OTHER PEOPLE: NOT DIFFICULT AT ALL
GAD7 TOTAL SCORE: 1

## 2024-12-12 ASSESSMENT — PAIN SCALES - GENERAL: PAINLEVEL_OUTOF10: MILD PAIN (3)

## 2024-12-12 NOTE — TELEPHONE ENCOUNTER
Received APPROVAL from [employer] regarding  DULoxetine (CYMBALTA) 30 MG capsule effective 11/12/24-12/12/25

## 2024-12-12 NOTE — TELEPHONE ENCOUNTER
Received PA request from [employer] regarding DULoxetine (CYMBALTA) 30 MG capsule. Submitted via [facility], awaiting response